# Patient Record
Sex: FEMALE | Race: BLACK OR AFRICAN AMERICAN | NOT HISPANIC OR LATINO | URBAN - METROPOLITAN AREA
[De-identification: names, ages, dates, MRNs, and addresses within clinical notes are randomized per-mention and may not be internally consistent; named-entity substitution may affect disease eponyms.]

---

## 2015-10-26 LAB
HPV_EXT - HISTORICAL: NORMAL
PAP SMEAR - HIM PATIENT REPORTED: NORMAL

## 2017-01-05 ENCOUNTER — COMMUNICATION - HEALTHEAST (OUTPATIENT)
Dept: FAMILY MEDICINE | Facility: CLINIC | Age: 37
End: 2017-01-05

## 2017-05-18 ENCOUNTER — OFFICE VISIT - HEALTHEAST (OUTPATIENT)
Dept: FAMILY MEDICINE | Facility: CLINIC | Age: 37
End: 2017-05-18

## 2017-05-18 DIAGNOSIS — R35.0 FREQUENCY OF URINATION: ICD-10-CM

## 2017-05-18 DIAGNOSIS — E55.9 VITAMIN D INSUFFICIENCY: ICD-10-CM

## 2017-05-18 LAB — HBA1C MFR BLD: 5.6 % (ref 3.5–6.1)

## 2017-05-23 ENCOUNTER — RECORDS - HEALTHEAST (OUTPATIENT)
Dept: ADMINISTRATIVE | Facility: OTHER | Age: 37
End: 2017-05-23

## 2017-05-25 ENCOUNTER — AMBULATORY - HEALTHEAST (OUTPATIENT)
Dept: FAMILY MEDICINE | Facility: CLINIC | Age: 37
End: 2017-05-25

## 2017-05-25 DIAGNOSIS — N39.0 UTI (URINARY TRACT INFECTION): ICD-10-CM

## 2017-05-26 ENCOUNTER — HOSPITAL ENCOUNTER (OUTPATIENT)
Dept: ULTRASOUND IMAGING | Facility: CLINIC | Age: 37
Discharge: HOME | End: 2017-05-26
Attending: OBSTETRICS & GYNECOLOGY

## 2017-05-26 ENCOUNTER — HOSPITAL ENCOUNTER (OUTPATIENT)
Dept: MAMMOGRAPHY | Facility: CLINIC | Age: 37
Discharge: HOME | End: 2017-05-26
Attending: OBSTETRICS & GYNECOLOGY

## 2017-05-26 DIAGNOSIS — N63.10 LUMP OF BREAST, RIGHT: ICD-10-CM

## 2017-07-03 ENCOUNTER — OFFICE VISIT - HEALTHEAST (OUTPATIENT)
Dept: FAMILY MEDICINE | Facility: CLINIC | Age: 37
End: 2017-07-03

## 2017-07-03 ENCOUNTER — COMMUNICATION - HEALTHEAST (OUTPATIENT)
Dept: FAMILY MEDICINE | Facility: CLINIC | Age: 37
End: 2017-07-03

## 2017-07-03 DIAGNOSIS — M54.9 BACK PAIN: ICD-10-CM

## 2017-07-03 ASSESSMENT — MIFFLIN-ST. JEOR: SCORE: 1316.02

## 2017-08-01 ENCOUNTER — COMMUNICATION - HEALTHEAST (OUTPATIENT)
Dept: FAMILY MEDICINE | Facility: CLINIC | Age: 37
End: 2017-08-01

## 2017-08-01 DIAGNOSIS — M54.9 BACK PAIN: ICD-10-CM

## 2017-09-15 ENCOUNTER — OFFICE VISIT - HEALTHEAST (OUTPATIENT)
Dept: FAMILY MEDICINE | Facility: CLINIC | Age: 37
End: 2017-09-15

## 2017-09-15 DIAGNOSIS — R30.0 DYSURIA: ICD-10-CM

## 2017-09-15 DIAGNOSIS — R39.89 SUSPECTED UTI: ICD-10-CM

## 2017-10-05 ENCOUNTER — OFFICE VISIT - HEALTHEAST (OUTPATIENT)
Dept: FAMILY MEDICINE | Facility: CLINIC | Age: 37
End: 2017-10-05

## 2017-10-05 DIAGNOSIS — Z23 NEED FOR IMMUNIZATION AGAINST INFLUENZA: ICD-10-CM

## 2017-10-05 DIAGNOSIS — R51.9 HEADACHE: ICD-10-CM

## 2017-10-05 DIAGNOSIS — G47.00 INSOMNIA: ICD-10-CM

## 2017-10-05 DIAGNOSIS — R53.83 FATIGUE: ICD-10-CM

## 2017-10-09 ENCOUNTER — COMMUNICATION - HEALTHEAST (OUTPATIENT)
Dept: FAMILY MEDICINE | Facility: CLINIC | Age: 37
End: 2017-10-09

## 2017-10-09 DIAGNOSIS — G47.00 INSOMNIA: ICD-10-CM

## 2018-02-15 ENCOUNTER — OFFICE VISIT - HEALTHEAST (OUTPATIENT)
Dept: FAMILY MEDICINE | Facility: CLINIC | Age: 38
End: 2018-02-15

## 2018-02-15 DIAGNOSIS — R00.2 PALPITATION: ICD-10-CM

## 2018-02-15 DIAGNOSIS — M54.9 BACK PAIN: ICD-10-CM

## 2018-04-25 ENCOUNTER — COMMUNICATION - HEALTHEAST (OUTPATIENT)
Dept: FAMILY MEDICINE | Facility: CLINIC | Age: 38
End: 2018-04-25

## 2018-04-25 DIAGNOSIS — G47.00 INSOMNIA: ICD-10-CM

## 2018-08-26 ENCOUNTER — COMMUNICATION - HEALTHEAST (OUTPATIENT)
Dept: FAMILY MEDICINE | Facility: CLINIC | Age: 38
End: 2018-08-26

## 2018-08-26 DIAGNOSIS — R10.9 ABDOMINAL PAIN: ICD-10-CM

## 2018-09-08 ENCOUNTER — COMMUNICATION - HEALTHEAST (OUTPATIENT)
Dept: FAMILY MEDICINE | Facility: CLINIC | Age: 38
End: 2018-09-08

## 2018-09-08 DIAGNOSIS — M54.9 BACK PAIN: ICD-10-CM

## 2018-09-25 ENCOUNTER — OFFICE VISIT - HEALTHEAST (OUTPATIENT)
Dept: FAMILY MEDICINE | Facility: CLINIC | Age: 38
End: 2018-09-25

## 2018-09-25 DIAGNOSIS — J02.9 SORETHROAT: ICD-10-CM

## 2018-09-25 DIAGNOSIS — J32.9 SINUSITIS: ICD-10-CM

## 2018-09-25 LAB — DEPRECATED S PYO AG THROAT QL EIA: NORMAL

## 2018-09-26 LAB — GROUP A STREP BY PCR: NORMAL

## 2018-10-23 ENCOUNTER — COMMUNICATION - HEALTHEAST (OUTPATIENT)
Dept: FAMILY MEDICINE | Facility: CLINIC | Age: 38
End: 2018-10-23

## 2019-01-15 ENCOUNTER — COMMUNICATION - HEALTHEAST (OUTPATIENT)
Dept: FAMILY MEDICINE | Facility: CLINIC | Age: 39
End: 2019-01-15

## 2019-01-22 ENCOUNTER — AMBULATORY - HEALTHEAST (OUTPATIENT)
Dept: FAMILY MEDICINE | Facility: CLINIC | Age: 39
End: 2019-01-22

## 2019-01-22 DIAGNOSIS — N39.0 URINARY TRACT INFECTION WITHOUT HEMATURIA, SITE UNSPECIFIED: ICD-10-CM

## 2019-04-17 ENCOUNTER — OFFICE VISIT - HEALTHEAST (OUTPATIENT)
Dept: FAMILY MEDICINE | Facility: CLINIC | Age: 39
End: 2019-04-17

## 2019-04-17 ENCOUNTER — COMMUNICATION - HEALTHEAST (OUTPATIENT)
Dept: FAMILY MEDICINE | Facility: CLINIC | Age: 39
End: 2019-04-17

## 2019-04-17 DIAGNOSIS — R53.82 CHRONIC FATIGUE: ICD-10-CM

## 2019-04-17 LAB
ALBUMIN SERPL-MCNC: 3.9 G/DL (ref 3.5–5)
ALP SERPL-CCNC: 41 U/L (ref 45–120)
ALT SERPL W P-5'-P-CCNC: <9 U/L (ref 0–45)
ANION GAP SERPL CALCULATED.3IONS-SCNC: 13 MMOL/L (ref 5–18)
AST SERPL W P-5'-P-CCNC: 20 U/L (ref 0–40)
BILIRUB SERPL-MCNC: 0.2 MG/DL (ref 0–1)
BUN SERPL-MCNC: 7 MG/DL (ref 8–22)
CALCIUM SERPL-MCNC: 9.3 MG/DL (ref 8.5–10.5)
CHLORIDE BLD-SCNC: 106 MMOL/L (ref 98–107)
CO2 SERPL-SCNC: 21 MMOL/L (ref 22–31)
CREAT SERPL-MCNC: 1.31 MG/DL (ref 0.6–1.1)
ERYTHROCYTE [DISTWIDTH] IN BLOOD BY AUTOMATED COUNT: 12.8 % (ref 11–14.5)
GFR SERPL CREATININE-BSD FRML MDRD: 46 ML/MIN/1.73M2
GLUCOSE BLD-MCNC: 78 MG/DL (ref 70–125)
HCT VFR BLD AUTO: 39 % (ref 35–47)
HGB BLD-MCNC: 13.2 G/DL (ref 12–16)
MCH RBC QN AUTO: 29.9 PG (ref 27–34)
MCHC RBC AUTO-ENTMCNC: 33.7 G/DL (ref 32–36)
MCV RBC AUTO: 89 FL (ref 80–100)
MONOCYTES NFR BLD AUTO: NEGATIVE %
PLATELET # BLD AUTO: 239 THOU/UL (ref 140–440)
PMV BLD AUTO: 7.9 FL (ref 7–10)
POTASSIUM BLD-SCNC: 3.9 MMOL/L (ref 3.5–5)
PROT SERPL-MCNC: 6.9 G/DL (ref 6–8)
RBC # BLD AUTO: 4.4 MILL/UL (ref 3.8–5.4)
SODIUM SERPL-SCNC: 140 MMOL/L (ref 136–145)
TSH SERPL DL<=0.005 MIU/L-ACNC: 1.51 UIU/ML (ref 0.3–5)
VIT B12 SERPL-MCNC: 364 PG/ML (ref 213–816)
WBC: 8.9 THOU/UL (ref 4–11)

## 2019-04-18 ENCOUNTER — AMBULATORY - HEALTHEAST (OUTPATIENT)
Dept: FAMILY MEDICINE | Facility: CLINIC | Age: 39
End: 2019-04-18

## 2019-04-18 ENCOUNTER — COMMUNICATION - HEALTHEAST (OUTPATIENT)
Dept: FAMILY MEDICINE | Facility: CLINIC | Age: 39
End: 2019-04-18

## 2019-04-18 DIAGNOSIS — R53.83 FATIGUE, UNSPECIFIED TYPE: ICD-10-CM

## 2019-04-18 LAB
25(OH)D3 SERPL-MCNC: 22.6 NG/ML (ref 30–80)
B BURGDOR IGG+IGM SER QL: 0.04 INDEX VALUE

## 2019-04-22 ENCOUNTER — COMMUNICATION - HEALTHEAST (OUTPATIENT)
Dept: FAMILY MEDICINE | Facility: CLINIC | Age: 39
End: 2019-04-22

## 2019-04-22 DIAGNOSIS — Z11.1 SCREENING EXAMINATION FOR PULMONARY TUBERCULOSIS: ICD-10-CM

## 2019-04-25 ENCOUNTER — COMMUNICATION - HEALTHEAST (OUTPATIENT)
Dept: FAMILY MEDICINE | Facility: CLINIC | Age: 39
End: 2019-04-25

## 2019-04-25 ENCOUNTER — RECORDS - HEALTHEAST (OUTPATIENT)
Dept: GENERAL RADIOLOGY | Facility: CLINIC | Age: 39
End: 2019-04-25

## 2019-04-25 ENCOUNTER — OFFICE VISIT - HEALTHEAST (OUTPATIENT)
Dept: FAMILY MEDICINE | Facility: CLINIC | Age: 39
End: 2019-04-25

## 2019-04-25 DIAGNOSIS — N17.9 AKI (ACUTE KIDNEY INJURY) (H): ICD-10-CM

## 2019-04-25 DIAGNOSIS — R40.0 SOMNOLENCE: ICD-10-CM

## 2019-04-25 DIAGNOSIS — R53.83 FATIGUE, UNSPECIFIED TYPE: ICD-10-CM

## 2019-04-25 DIAGNOSIS — Z11.1 SCREENING-PULMONARY TB: ICD-10-CM

## 2019-04-25 DIAGNOSIS — Z11.1 ENCOUNTER FOR SCREENING FOR RESPIRATORY TUBERCULOSIS: ICD-10-CM

## 2019-04-25 LAB
ANION GAP SERPL CALCULATED.3IONS-SCNC: 10 MMOL/L (ref 5–18)
BUN SERPL-MCNC: 6 MG/DL (ref 8–22)
CALCIUM SERPL-MCNC: 9.6 MG/DL (ref 8.5–10.5)
CHLORIDE BLD-SCNC: 105 MMOL/L (ref 98–107)
CO2 SERPL-SCNC: 23 MMOL/L (ref 22–31)
CREAT SERPL-MCNC: 0.72 MG/DL (ref 0.6–1.1)
GFR SERPL CREATININE-BSD FRML MDRD: >60 ML/MIN/1.73M2
GLUCOSE BLD-MCNC: 98 MG/DL (ref 70–125)
POTASSIUM BLD-SCNC: 3.8 MMOL/L (ref 3.5–5)
SODIUM SERPL-SCNC: 138 MMOL/L (ref 136–145)

## 2019-07-03 ENCOUNTER — OFFICE VISIT - HEALTHEAST (OUTPATIENT)
Dept: FAMILY MEDICINE | Facility: CLINIC | Age: 39
End: 2019-07-03

## 2019-07-03 DIAGNOSIS — R35.0 INCREASED FREQUENCY OF URINATION: ICD-10-CM

## 2019-07-03 DIAGNOSIS — R10.13 EPIGASTRIC PAIN: ICD-10-CM

## 2019-07-03 LAB
ALBUMIN UR-MCNC: NEGATIVE MG/DL
APPEARANCE UR: CLEAR
BILIRUB UR QL STRIP: NEGATIVE
COLOR UR AUTO: YELLOW
GLUCOSE UR STRIP-MCNC: NEGATIVE MG/DL
HGB UR QL STRIP: NEGATIVE
KETONES UR STRIP-MCNC: NEGATIVE MG/DL
LEUKOCYTE ESTERASE UR QL STRIP: NEGATIVE
NITRATE UR QL: NEGATIVE
PH UR STRIP: 6 [PH] (ref 5–8)
SP GR UR STRIP: 1.01 (ref 1–1.03)
UROBILINOGEN UR STRIP-ACNC: NORMAL

## 2019-07-03 ASSESSMENT — MIFFLIN-ST. JEOR: SCORE: 1335.86

## 2019-07-04 LAB — BACTERIA SPEC CULT: NORMAL

## 2019-09-26 ENCOUNTER — OFFICE VISIT - HEALTHEAST (OUTPATIENT)
Dept: FAMILY MEDICINE | Facility: CLINIC | Age: 39
End: 2019-09-26

## 2019-09-26 ENCOUNTER — COMMUNICATION - HEALTHEAST (OUTPATIENT)
Dept: FAMILY MEDICINE | Facility: CLINIC | Age: 39
End: 2019-09-26

## 2019-09-26 DIAGNOSIS — R07.0 THROAT PAIN: ICD-10-CM

## 2019-09-26 LAB — DEPRECATED S PYO AG THROAT QL EIA: NORMAL

## 2019-09-27 LAB — GROUP A STREP BY PCR: NORMAL

## 2019-10-07 ENCOUNTER — OFFICE VISIT - HEALTHEAST (OUTPATIENT)
Dept: FAMILY MEDICINE | Facility: CLINIC | Age: 39
End: 2019-10-07

## 2019-10-07 DIAGNOSIS — M79.10 MYALGIA: ICD-10-CM

## 2019-10-07 ASSESSMENT — MIFFLIN-ST. JEOR: SCORE: 1343.23

## 2019-10-09 ENCOUNTER — COMMUNICATION - HEALTHEAST (OUTPATIENT)
Dept: FAMILY MEDICINE | Facility: CLINIC | Age: 39
End: 2019-10-09

## 2019-10-09 DIAGNOSIS — F51.01 PRIMARY INSOMNIA: ICD-10-CM

## 2019-10-09 DIAGNOSIS — G89.4 CHRONIC PAIN SYNDROME: ICD-10-CM

## 2019-12-06 ENCOUNTER — OFFICE VISIT - HEALTHEAST (OUTPATIENT)
Dept: SLEEP MEDICINE | Facility: CLINIC | Age: 39
End: 2019-12-06

## 2019-12-06 DIAGNOSIS — G47.61 PERIODIC LIMB MOVEMENT DISORDER: ICD-10-CM

## 2019-12-06 DIAGNOSIS — G47.00 PERSISTENT INSOMNIA: ICD-10-CM

## 2019-12-06 DIAGNOSIS — Z72.821 INADEQUATE SLEEP HYGIENE: ICD-10-CM

## 2019-12-06 DIAGNOSIS — G47.10 HYPERSOMNIA, UNSPECIFIED: ICD-10-CM

## 2019-12-06 ASSESSMENT — MIFFLIN-ST. JEOR: SCORE: 1361.38

## 2019-12-18 ENCOUNTER — OFFICE VISIT - HEALTHEAST (OUTPATIENT)
Dept: FAMILY MEDICINE | Facility: CLINIC | Age: 39
End: 2019-12-18

## 2019-12-18 DIAGNOSIS — G89.29 CHRONIC BILATERAL LOW BACK PAIN WITHOUT SCIATICA: ICD-10-CM

## 2019-12-18 DIAGNOSIS — M54.50 CHRONIC BILATERAL LOW BACK PAIN WITHOUT SCIATICA: ICD-10-CM

## 2019-12-18 DIAGNOSIS — F51.01 PRIMARY INSOMNIA: ICD-10-CM

## 2019-12-18 ASSESSMENT — MIFFLIN-ST. JEOR: SCORE: 1357.69

## 2019-12-30 ENCOUNTER — COMMUNICATION - HEALTHEAST (OUTPATIENT)
Dept: FAMILY MEDICINE | Facility: CLINIC | Age: 39
End: 2019-12-30

## 2019-12-30 DIAGNOSIS — N30.00 ACUTE CYSTITIS WITHOUT HEMATURIA: ICD-10-CM

## 2020-01-17 ENCOUNTER — RECORDS - HEALTHEAST (OUTPATIENT)
Dept: ADMINISTRATIVE | Facility: OTHER | Age: 40
End: 2020-01-17

## 2020-01-22 ENCOUNTER — COMMUNICATION - HEALTHEAST (OUTPATIENT)
Dept: SLEEP MEDICINE | Facility: CLINIC | Age: 40
End: 2020-01-22

## 2020-02-03 DIAGNOSIS — G47.10 HYPERSOMNIA: Primary | ICD-10-CM

## 2020-03-04 ENCOUNTER — COMMUNICATION - HEALTHEAST (OUTPATIENT)
Dept: SLEEP MEDICINE | Facility: CLINIC | Age: 40
End: 2020-03-04

## 2020-05-22 ENCOUNTER — COMMUNICATION - HEALTHEAST (OUTPATIENT)
Dept: FAMILY MEDICINE | Facility: CLINIC | Age: 40
End: 2020-05-22

## 2020-05-28 ENCOUNTER — COMMUNICATION - HEALTHEAST (OUTPATIENT)
Dept: ADMINISTRATIVE | Facility: CLINIC | Age: 40
End: 2020-05-28

## 2020-05-29 ENCOUNTER — OFFICE VISIT - HEALTHEAST (OUTPATIENT)
Dept: FAMILY MEDICINE | Facility: CLINIC | Age: 40
End: 2020-05-29

## 2020-05-29 DIAGNOSIS — Z01.818 PRE-OPERATIVE EXAMINATION: ICD-10-CM

## 2020-05-29 DIAGNOSIS — N64.4 BREAST PAIN: ICD-10-CM

## 2020-05-29 LAB
HCG UR QL: NEGATIVE
HGB BLD-MCNC: 12.7 G/DL (ref 12–16)

## 2020-08-19 ENCOUNTER — COMMUNICATION - HEALTHEAST (OUTPATIENT)
Dept: CARDIOLOGY | Facility: CLINIC | Age: 40
End: 2020-08-19

## 2020-08-28 ENCOUNTER — RECORDS - HEALTHEAST (OUTPATIENT)
Dept: HEALTH INFORMATION MANAGEMENT | Facility: CLINIC | Age: 40
End: 2020-08-28

## 2020-11-12 ENCOUNTER — AMBULATORY - HEALTHEAST (OUTPATIENT)
Dept: NURSING | Facility: CLINIC | Age: 40
End: 2020-11-12

## 2021-02-11 ENCOUNTER — COMMUNICATION - HEALTHEAST (OUTPATIENT)
Dept: FAMILY MEDICINE | Facility: CLINIC | Age: 41
End: 2021-02-11

## 2021-02-15 ENCOUNTER — OFFICE VISIT - HEALTHEAST (OUTPATIENT)
Dept: FAMILY MEDICINE | Facility: CLINIC | Age: 41
End: 2021-02-15

## 2021-02-15 DIAGNOSIS — M54.50 CHRONIC BILATERAL LOW BACK PAIN WITHOUT SCIATICA: ICD-10-CM

## 2021-02-15 DIAGNOSIS — N76.0 ACUTE VAGINITIS: ICD-10-CM

## 2021-02-15 DIAGNOSIS — G89.29 CHRONIC BILATERAL LOW BACK PAIN WITHOUT SCIATICA: ICD-10-CM

## 2021-02-17 ENCOUNTER — RECORDS - HEALTHEAST (OUTPATIENT)
Dept: ADMINISTRATIVE | Facility: OTHER | Age: 41
End: 2021-02-17

## 2021-02-24 ENCOUNTER — HOSPITAL ENCOUNTER (OUTPATIENT)
Dept: MAMMOGRAPHY | Facility: CLINIC | Age: 41
Discharge: HOME | End: 2021-02-24
Attending: OBSTETRICS & GYNECOLOGY

## 2021-02-24 ENCOUNTER — HOSPITAL ENCOUNTER (OUTPATIENT)
Dept: ULTRASOUND IMAGING | Facility: CLINIC | Age: 41
Discharge: HOME | End: 2021-02-24
Attending: OBSTETRICS & GYNECOLOGY

## 2021-02-24 DIAGNOSIS — N63.0 BREAST LUMP: ICD-10-CM

## 2021-03-30 ENCOUNTER — AMBULATORY - HEALTHEAST (OUTPATIENT)
Dept: NURSING | Facility: CLINIC | Age: 41
End: 2021-03-30

## 2021-04-20 ENCOUNTER — AMBULATORY - HEALTHEAST (OUTPATIENT)
Dept: NURSING | Facility: CLINIC | Age: 41
End: 2021-04-20

## 2021-04-28 ENCOUNTER — COMMUNICATION - HEALTHEAST (OUTPATIENT)
Dept: FAMILY MEDICINE | Facility: CLINIC | Age: 41
End: 2021-04-28

## 2021-04-28 DIAGNOSIS — M54.50 CHRONIC BILATERAL LOW BACK PAIN WITHOUT SCIATICA: ICD-10-CM

## 2021-04-28 DIAGNOSIS — G89.29 CHRONIC BILATERAL LOW BACK PAIN WITHOUT SCIATICA: ICD-10-CM

## 2021-04-29 ENCOUNTER — RECORDS - HEALTHEAST (OUTPATIENT)
Dept: FAMILY MEDICINE | Facility: CLINIC | Age: 41
End: 2021-04-29

## 2021-05-27 ENCOUNTER — OFFICE VISIT - HEALTHEAST (OUTPATIENT)
Dept: FAMILY MEDICINE | Facility: CLINIC | Age: 41
End: 2021-05-27

## 2021-05-27 ENCOUNTER — RECORDS - HEALTHEAST (OUTPATIENT)
Dept: GENERAL RADIOLOGY | Facility: CLINIC | Age: 41
End: 2021-05-27

## 2021-05-27 DIAGNOSIS — K59.01 SLOW TRANSIT CONSTIPATION: ICD-10-CM

## 2021-05-27 DIAGNOSIS — Z13.220 SCREENING CHOLESTEROL LEVEL: ICD-10-CM

## 2021-05-27 DIAGNOSIS — L65.9 HAIR LOSS: ICD-10-CM

## 2021-05-27 DIAGNOSIS — E55.9 VITAMIN D INSUFFICIENCY: ICD-10-CM

## 2021-05-27 DIAGNOSIS — R10.13 ABDOMINAL PAIN, EPIGASTRIC: ICD-10-CM

## 2021-05-27 DIAGNOSIS — F51.01 PRIMARY INSOMNIA: ICD-10-CM

## 2021-05-27 DIAGNOSIS — R53.82 CHRONIC FATIGUE: ICD-10-CM

## 2021-05-27 DIAGNOSIS — G89.29 CHRONIC BILATERAL LOW BACK PAIN WITHOUT SCIATICA: ICD-10-CM

## 2021-05-27 DIAGNOSIS — Z11.1 ENCOUNTER FOR SCREENING FOR RESPIRATORY TUBERCULOSIS: ICD-10-CM

## 2021-05-27 DIAGNOSIS — M54.50 CHRONIC BILATERAL LOW BACK PAIN WITHOUT SCIATICA: ICD-10-CM

## 2021-05-27 DIAGNOSIS — Z11.1 SCREENING EXAMINATION FOR PULMONARY TUBERCULOSIS: ICD-10-CM

## 2021-05-27 LAB
ALBUMIN SERPL-MCNC: 4 G/DL (ref 3.5–5)
ALP SERPL-CCNC: 44 U/L (ref 45–120)
ALT SERPL W P-5'-P-CCNC: 10 U/L (ref 0–45)
ANION GAP SERPL CALCULATED.3IONS-SCNC: 11 MMOL/L (ref 5–18)
AST SERPL W P-5'-P-CCNC: 23 U/L (ref 0–40)
BILIRUB SERPL-MCNC: 0.4 MG/DL (ref 0–1)
BUN SERPL-MCNC: 6 MG/DL (ref 8–22)
CALCIUM SERPL-MCNC: 9.1 MG/DL (ref 8.5–10.5)
CHLORIDE BLD-SCNC: 105 MMOL/L (ref 98–107)
CHOLEST SERPL-MCNC: 223 MG/DL
CO2 SERPL-SCNC: 23 MMOL/L (ref 22–31)
CREAT SERPL-MCNC: 0.68 MG/DL (ref 0.6–1.1)
ERYTHROCYTE [DISTWIDTH] IN BLOOD BY AUTOMATED COUNT: 14 % (ref 11–14.5)
FASTING STATUS PATIENT QL REPORTED: ABNORMAL
GFR SERPL CREATININE-BSD FRML MDRD: >60 ML/MIN/1.73M2
GLUCOSE BLD-MCNC: 91 MG/DL (ref 70–125)
HCT VFR BLD AUTO: 39.2 % (ref 35–47)
HDLC SERPL-MCNC: 75 MG/DL
HGB BLD-MCNC: 13.4 G/DL (ref 12–16)
LDLC SERPL CALC-MCNC: 131 MG/DL
LIPASE SERPL-CCNC: 11 U/L (ref 0–52)
MCH RBC QN AUTO: 30.1 PG (ref 27–34)
MCHC RBC AUTO-ENTMCNC: 34.2 G/DL (ref 32–36)
MCV RBC AUTO: 88 FL (ref 80–100)
PLATELET # BLD AUTO: 266 THOU/UL (ref 140–440)
PMV BLD AUTO: 9.8 FL (ref 7–10)
POTASSIUM BLD-SCNC: 4 MMOL/L (ref 3.5–5)
PROT SERPL-MCNC: 6.9 G/DL (ref 6–8)
RBC # BLD AUTO: 4.45 MILL/UL (ref 3.8–5.4)
SODIUM SERPL-SCNC: 139 MMOL/L (ref 136–145)
TRIGL SERPL-MCNC: 87 MG/DL
TSH SERPL DL<=0.005 MIU/L-ACNC: 2.28 UIU/ML (ref 0.3–5)
WBC: 7.7 THOU/UL (ref 4–11)

## 2021-05-27 RX ORDER — FERROUS SULFATE 325(65) MG
1 TABLET ORAL
Status: SHIPPED | COMMUNITY
Start: 2021-05-27 | End: 2022-08-18

## 2021-05-27 RX ORDER — TRAMADOL HYDROCHLORIDE 50 MG/1
50 TABLET ORAL DAILY PRN
Qty: 30 TABLET | Refills: 0 | Status: SHIPPED | OUTPATIENT
Start: 2021-05-27 | End: 2022-08-18

## 2021-05-27 RX ORDER — MULTIPLE VITAMINS W/ MINERALS TAB 9MG-400MCG
1 TAB ORAL DAILY
Status: SHIPPED | COMMUNITY
Start: 2021-05-27 | End: 2024-03-06

## 2021-05-27 NOTE — TELEPHONE ENCOUNTER
Spoke to patient.  Reviewed her results.  She will take 4000 vitamin D daily.  I will give her Adderall for the fatigue.  She will stop NSAIDs, because elevation creatinine.  She will follow-up with me in 1 month

## 2021-05-28 LAB
25(OH)D3 SERPL-MCNC: 43 NG/ML (ref 30–80)
25(OH)D3 SERPL-MCNC: 43 NG/ML (ref 30–80)

## 2021-05-28 NOTE — TELEPHONE ENCOUNTER
Per pcp, please start prior authorization.     Adderall XR 10mg, once daily  DX:  Fatigue  Medications that she has tried:  None other

## 2021-05-28 NOTE — TELEPHONE ENCOUNTER
Please start prior authorization.    Adderall XR 10mg, once daily  DX:  Fatigue  Medications that she has tried:  None other

## 2021-05-28 NOTE — PROGRESS NOTES
ASSESSMENT/PLAN:  Screening-pulmonary TB  This is a 38-year-old female who comes in today for tuberculosis screening for her work.  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  Chest x-ray today did not reveal any active pulmonary processes.    ASHLEY (acute kidney injury) (H)  -     Basic Metabolic Panel  The patient had recent acute kidney injury likely from taking Aleve for her back pain.  She since has stopped.  We will recheck the creatinine and GFR    Somnolence  Her Adderall was prescribed to help with her fatigue and sleepiness.  However, her insurance denied coverage for Adderall.  I do not have any other alternative medications that will be covered by her insurance.  I offered to give her a referral to the sleep clinic because of her frequent waking up at night but she declined    Other orders  -     Influenza, Seasonal Quad, Preservative Free 36+ Months    SUBJECTIVE:    Brittany Pickard is a 38 y.o. female who came in today for chest x-ray for tuberculosis screening.  The patient received BCG vaccine.  She has had positive PPD skin test in the past.  She works as an .  She just got a new job and her employer is asking for tuberculosis status.  Patient is not actively coughing.  She does not have any history of hemoptysis, chills, night sweats, unintentional weight loss.    The patient has been feeling very fatigued and sleepy.  She brought this up during her last visit with me.  Mononucleosis, Lyme's, thyroid, CBC, CMP, vitamin D, B12 were all drawn and the result showed a slightly low vitamin D at 22.6 and acute renal insufficiency with creatinine of 1.31 and GFR of 56.  After much discussion with the patient, she stated that she has been taking NSAIDs for her back pain.  In the discussion regarding her fatigue and sleepiness, the patient says that she wakes up frequently at night.  She does not snore.  She does not have apnea spells.  She does not feel depressed or anxious.   Adderall prescription was sent to her pharmacy but she received news from her insurance yesterday that it would not cover the Adderall.  The patient is wondering if there is any other stimulant or medication that will help with her fatigue and sleepiness    Review of Systems (except those mentioned above)  Constitutional: Negative.   HENT: Negative.   Eyes: Negative.   Respiratory: Negative.   Cardiovascular: Negative.   Gastrointestinal: Negative.   Endocrine: Negative.   Genitourinary: Negative.   Musculoskeletal: Negative.   Skin: Negative.   Allergic/Immunologic: Negative.   Neurological: Negative.   Hematological: Negative.   Psychiatric/Behavioral: Negative.     Patient Active Problem List    Diagnosis Date Noted     Allergic Rhinitis      Insomnia      Vitamin D Deficiency      Allergies   Allergen Reactions     Other Allergy (See Comments)      Melatonin TABS, 2013.       Current Outpatient Medications   Medication Sig Dispense Refill     dextroamphetamine-amphetamine (ADDERALL XR) 10 MG 24 hr capsule Take 1 capsule (10 mg total) by mouth daily. 30 capsule 0     No current facility-administered medications for this visit.      No past medical history on file.  Past Surgical History:   Procedure Laterality Date     AUGMENTATION MAMMAPLASTY       [place]      Description:  Section;  Recorded: 2011;     Social History     Socioeconomic History     Marital status: Single     Spouse name: None     Number of children: None     Years of education: None     Highest education level: None   Occupational History     None   Social Needs     Financial resource strain: None     Food insecurity:     Worry: None     Inability: None     Transportation needs:     Medical: None     Non-medical: None   Tobacco Use     Smoking status: Never Smoker     Smokeless tobacco: Never Used   Substance and Sexual Activity     Alcohol use: None     Drug use: None     Sexual activity: None   Lifestyle      Physical activity:     Days per week: None     Minutes per session: None     Stress: None   Relationships     Social connections:     Talks on phone: None     Gets together: None     Attends Yarsanism service: None     Active member of club or organization: None     Attends meetings of clubs or organizations: None     Relationship status: None     Intimate partner violence:     Fear of current or ex partner: None     Emotionally abused: None     Physically abused: None     Forced sexual activity: None   Other Topics Concern     None   Social History Narrative    ** Merged History Encounter **          No family history on file.      OBJECTIVE:    Vitals:    04/25/19 0808   BP: 100/80   Patient Site: Left Arm   Patient Position: Sitting   Cuff Size: Adult Regular   Pulse: 72   Weight: 150 lb (68 kg)     Body mass index is 25.15 kg/m .    Physical Exam:  Constitutional: Patient is oriented to person, place, and time. Patient appears well-developed and well-nourished. No distress.   Head: Normocephalic and atraumatic.   Right Ear: External ear normal.   Left Ear: External ear normal.   Nose: Nose normal.   Eyes: Conjunctivae and EOM are normal. Right eye exhibits no discharge. Left eye exhibits no discharge. No scleral icterus.   Neurological: Patient is alert and oriented to person, place, and time. Patient has normal reflexes. No cranial nerve deficit. Coordination normal.   Skin: No rash noted. Patient is not diaphoretic. No erythema. No pallor.        Results for orders placed or performed in visit on 04/17/19   Thyroid Stimulating Hormone (TSH)   Result Value Ref Range    TSH 1.51 0.30 - 5.00 uIU/mL   Lyme Antibody Cascade   Result Value Ref Range    Lyme Antibody Cascade 0.04 <0.90 Index Value   Vitamin D, Total (25-Hydroxy)   Result Value Ref Range    Vitamin D, Total (25-Hydroxy) 22.6 (L) 30.0 - 80.0 ng/mL   Comprehensive Metabolic Panel   Result Value Ref Range    Sodium 140 136 - 145 mmol/L    Potassium  3.9 3.5 - 5.0 mmol/L    Chloride 106 98 - 107 mmol/L    CO2 21 (L) 22 - 31 mmol/L    Anion Gap, Calculation 13 5 - 18 mmol/L    Glucose 78 70 - 125 mg/dL    BUN 7 (L) 8 - 22 mg/dL    Creatinine 1.31 (H) 0.60 - 1.10 mg/dL    GFR MDRD Af Amer 56 (L) >60 mL/min/1.73m2    GFR MDRD Non Af Amer 46 (L) >60 mL/min/1.73m2    Bilirubin, Total 0.2 0.0 - 1.0 mg/dL    Calcium 9.3 8.5 - 10.5 mg/dL    Protein, Total 6.9 6.0 - 8.0 g/dL    Albumin 3.9 3.5 - 5.0 g/dL    Alkaline Phosphatase 41 (L) 45 - 120 U/L    AST 20 0 - 40 U/L    ALT <9 0 - 45 U/L   HM2(CBC w/o Differential)   Result Value Ref Range    WBC 8.9 4.0 - 11.0 thou/uL    RBC 4.40 3.80 - 5.40 mill/uL    Hemoglobin 13.2 12.0 - 16.0 g/dL    Hematocrit 39.0 35.0 - 47.0 %    MCV 89 80 - 100 fL    MCH 29.9 27.0 - 34.0 pg    MCHC 33.7 32.0 - 36.0 g/dL    RDW 12.8 11.0 - 14.5 %    Platelets 239 140 - 440 thou/uL    MPV 7.9 7.0 - 10.0 fL   Vitamin B12   Result Value Ref Range    Vitamin B-12 364 213 - 816 pg/mL   Mononucleosis Screen   Result Value Ref Range    Mono Screen Negative Negative

## 2021-05-28 NOTE — TELEPHONE ENCOUNTER
Who is calling:  Patient   Reason for Call: Patient's employer requesting patient to have updated [place].  Pt had  positive skin test for TB in6/24/15 (  X-ray  6/26/15 - see chart)   Pt  Doesn't want to come  in to clinic,  just wants updated letter stating she is negative for TB,   Date of last appointment with primary care:  4/17/19   Has the patient been recently seen:  Yes  Okay to leave a detailed message: Yes

## 2021-05-28 NOTE — TELEPHONE ENCOUNTER
Central PA team  [phone]  Pool: [facility] PA MED ([id number])          PA has been initiated.       PA form completed and faxed insurance via Cover My Meds     Key:  [id number]     Medication:  Amphetamine-Dextroamphet ER 10MG OR CP24    Insurance:  [place]        Response will be received via fax and may take up to 5-10 business days depending on plan

## 2021-05-28 NOTE — TELEPHONE ENCOUNTER
Please create telephone encounter for prior authorization & route to the appropriate deparment.     Adderall XR 10mg, once daily  DX:  Fatigue  Medications that she has tried:  None other

## 2021-05-29 ENCOUNTER — RECORDS - HEALTHEAST (OUTPATIENT)
Dept: ADMINISTRATIVE | Facility: CLINIC | Age: 41
End: 2021-05-29

## 2021-05-30 NOTE — PROGRESS NOTES
ASSESSMENT/PLAN:    Increased frequency of urination  39-year-old female presented with 1 and half week of lower abdominal pressure which she has had similar to her urinary tract infection.  She does not have any other urinary symptoms.  Urinalysis was unremarkable.  I will treat her with Bactrim empirically.  Await urine culture for further management.  If she has further questions or concerns or no improvement of symptoms then to call.  She verbalized understanding and agreed with the plan  -     Urinalysis-UC if Indicated  -     Culture, Urine  -     sulfamethoxazole-trimethoprim (BACTRIM DS) 800-160 mg per tablet; Take 1 tablet by mouth 2 (two) times a day for 3 days.    Epigastric pain  The patient has had chronic epigastric pain that is worse with eating.  Will check H. pylori fecal antigen.  Avoid NSAIDs.  May take Tylenol as needed.  -     H. pylori Antigen, Stool(HPSAG); Future      SUBJECTIVE:    Brittany Pickard is a 39 y.o. female who came in today     Urine  For the last 1 and half week the patient has had lower abdominal pain associated with urination.  She denies dysuria.  She denies hematuria, urinary frequency, urinary urgency, back pain, fever, chills, myalgia, malaise.  The patient has had these similar symptoms in the past which turned out to be urinary tract infection.  Symptoms have improved with antibiotics    Epigastric pain  The patient has had 2 months of epigastric discomfort.  Epigastric pain is worse with eating.  Denies nausea or vomiting.  Denies fever or chills.  No radiation of the pain.  Patient does not smoke cigarettes.  Patient does not drink alcohol.  Occasionally takes ibuprofen and Tylenol for the pain.  No change in bowel habits.  Has been exposed to family members and friends with H. pylori    Review of Systems (except those mentioned above)  Constitutional: Negative.   HENT: Negative.   Eyes: Negative.   Respiratory: Negative.   Cardiovascular: Negative.   Gastrointestinal:  Negative.   Endocrine: Negative.   Genitourinary: Negative.   Musculoskeletal: Negative.   Skin: Negative.   Allergic/Immunologic: Negative.   Neurological: Negative.   Hematological: Negative.   Psychiatric/Behavioral: Negative.     Patient Active Problem List    Diagnosis Date Noted     Allergic Rhinitis      Insomnia      Vitamin D Deficiency      Allergies   Allergen Reactions     Other Allergy (See Comments)      Melatonin TABS, 2013.       Current Outpatient Medications   Medication Sig Dispense Refill     citalopram (CELEXA) 10 MG tablet Take 1 tablet (10 mg total) by mouth daily. 30 tablet 0     dextroamphetamine-amphetamine (ADDERALL XR) 10 MG 24 hr capsule Take 1 capsule (10 mg total) by mouth daily. 30 capsule 0     sulfamethoxazole-trimethoprim (BACTRIM DS) 800-160 mg per tablet Take 1 tablet by mouth 2 (two) times a day for 3 days. 6 tablet 0     No current facility-administered medications for this visit.      No past medical history on file.  Past Surgical History:   Procedure Laterality Date     AUGMENTATION MAMMAPLASTY       [place]IVERY [place]      Description:  Section;  Recorded: 2011;     Social History     Socioeconomic History     Marital status: Single     Spouse name: None     Number of children: None     Years of education: None     Highest education level: None   Occupational History     None   Social Needs     Financial resource strain: None     Food insecurity:     Worry: None     Inability: None     Transportation needs:     Medical: None     Non-medical: None   Tobacco Use     Smoking status: Never Smoker     Smokeless tobacco: Never Used   Substance and Sexual Activity     Alcohol use: None     Drug use: None     Sexual activity: None   Lifestyle     Physical activity:     Days per week: None     Minutes per session: None     Stress: None   Relationships     Social connections:     Talks on phone: None     Gets together: None     Attends Mu-ism service:  "None     Active member of club or organization: None     Attends meetings of clubs or organizations: None     Relationship status: None     Intimate partner violence:     Fear of current or ex partner: None     Emotionally abused: None     Physically abused: None     Forced sexual activity: None   Other Topics Concern     None   Social History Narrative    ** Merged History Encounter **          No family history on file.      OBJECTIVE:    Vitals:    07/03/19 1159   BP: 120/60   Pulse: 68   Weight: 146 lb 6 oz (66.4 kg)   Height: 5' 4.75\" (1.645 m)     Body mass index is 24.55 kg/m .    Physical Exam:  Constitutional: Patient was oriented to person, place, and time. Patient appeared well-developed and well-nourished. No distress.   Head: Normocephalic and atraumatic.   Right Ear: External ear normal.   Left Ear: External ear normal.   Nose: Nose normal.   Eyes: Conjunctivae and EOM were normal.  Right eye exhibited no discharge. Left eye exhibited no discharge. No scleral icterus.   Abdominal: Soft. Bowel sounds were normal. Patient exhibited no distension and no mass. There was tenderness at the epigastrium and suprapubic area. There was no rebound and no guarding.  No tenderness to percussion of the costovertebral angle  Skin: Skin was warm and dry. No rash noted. Patient was not diaphoretic. No erythema. No pallor.       Results for orders placed or performed in visit on 07/03/19   Urinalysis-UC if Indicated   Result Value Ref Range    Color, UA Yellow Colorless, Yellow, Straw, Light Yellow    Clarity, UA Clear Clear    Glucose, UA Negative Negative    Bilirubin, UA Negative Negative    Ketones, UA Negative Negative    Specific Gravity, UA 1.010 1.005 - 1.030    Blood, UA Negative Negative    pH, UA 6.0 5.0 - 8.0    Protein, UA Negative Negative mg/dL    Urobilinogen, UA 0.2 E.U./dL 0.2 E.U./dL, 1.0 E.U./dL    Nitrite, UA Negative Negative    Leukocytes, UA Negative Negative       "

## 2021-05-31 ENCOUNTER — RECORDS - HEALTHEAST (OUTPATIENT)
Dept: ADMINISTRATIVE | Facility: CLINIC | Age: 41
End: 2021-05-31

## 2021-05-31 VITALS — WEIGHT: 144 LBS | BODY MASS INDEX: 24.15 KG/M2

## 2021-05-31 VITALS — WEIGHT: 142 LBS | HEIGHT: 65 IN | BODY MASS INDEX: 23.66 KG/M2

## 2021-05-31 VITALS — BODY MASS INDEX: 23.98 KG/M2 | WEIGHT: 144.13 LBS

## 2021-06-01 VITALS — WEIGHT: 145.06 LBS | BODY MASS INDEX: 24.33 KG/M2

## 2021-06-01 NOTE — TELEPHONE ENCOUNTER
Below message was relayed to the patient.   Patient was agreeable, and will be completing an e-visit today. Nothing further needed at this time. Denise Taylor

## 2021-06-01 NOTE — PROGRESS NOTES
Assessment:   1. Throat pain  Positive for sinusitis with post nasal drip.   - Rapid Strep A Screen-Throat  - Group A Strep, RNA Direct Detection, Throat  - azithromycin (ZITHROMAX) 250 MG tablet; Take 1 tablet (250 mg total) by mouth daily for 5 days. Take 500 mg (2 x 250 mg tablets) on day 1 followed by 250 mg (1 tablet) on days 2-5.  Dispense: 6 tablet; Refill: 0  - fluticasone propionate (FLONASE ALLERGY RELIEF) 50 mcg/actuation nasal spray; 1 spray into each nostril 2 (two) times a day.  Dispense: 16 g; Refill: 0  - loratadine (CLARITIN) 10 mg tablet; Take 1 tablet (10 mg total) by mouth daily.  Dispense: 30 tablet; Refill: 2     Plan:   Patient placed on antibiotics.  Use of OTC analgesics recommended as well as salt water gargles.  Follow up as needed.     Subjective:   Brittany Pickard is a 39 y.o. female who presents for evaluation of sore throat. Associated symptoms include pain while swallowing, post nasal drip, sinus and nasal congestion and sore throat. Onset of symptoms was 2 days ago, and have been gradually worsening since that time. She is drinking moderate amounts of fluids. She has not had a recent close exposure to someone with proven streptococcal pharyngitis.    The following portions of the patient's history were reviewed and updated as appropriate: allergies, current medications, past family history, past medical history, past social history, past surgical history and problem list.    Review of Systems  A 12 point comprehensive review of systems was negative except as noted.      Objective:   /60 (Patient Site: Right Arm, Patient Position: Sitting, Cuff Size: Adult Regular)   Pulse 72   Temp 99.1  F (37.3  C) (Oral)   Resp 16   Wt 151 lb 9.6 oz (68.8 kg)   LMP 09/03/2019 (Approximate)   SpO2 100%   Breastfeeding? No   BMI 25.42 kg/m    General appearance: alert, appears stated age and cooperative  Head: Normocephalic, without obvious abnormality, atraumatic, sinuses tender to  percussion  Eyes: conjunctivae/corneas clear. PERRL, EOM's intact. Fundi benign.  Ears: normal TM's and external ear canals both ears  Nose: yellow discharge, moderate congestion, turbinates inflamed  Throat: lips, mucosa, and tongue normal; teeth and gums normal  Neck: no adenopathy, no carotid bruit, no JVD, supple, symmetrical, trachea midline and thyroid not enlarged, symmetric, no tenderness/mass/nodules  Lungs: clear to auscultation bilaterally  Heart: regular rate and rhythm, S1, S2 normal, no murmur, click, rub or gallop  Pulses: 2+ and symmetric  Skin: Skin color, texture, turgor normal. No rashes or lesions  Lymph nodes: Cervical, supraclavicular, and axillary nodes normal.  Neurologic: Grossly normal    Laboratory  Strep test done. Results:negative.

## 2021-06-02 VITALS — BODY MASS INDEX: 25.25 KG/M2 | WEIGHT: 150.56 LBS

## 2021-06-02 NOTE — PROGRESS NOTES
ASSESSMENT/PLAN:  Myalgia, insomnia  This is a pleasant 39-year-old female who has chronic myalgia and insomnia presented today for follow-up.  She has an appointment with sleep clinic in a couple months.  I will give her gabapentin.  She will call me in 1 week to update me.  Suspect her symptoms are related to fibromyalgia and insomnia.  She has tried citalopram without relief.  I gave her prescription for Adderall but it was not covered by her insurance.  Take 1000 IU vitamin D every day and plan for recheck in 3 months  -     gabapentin (NEURONTIN) 100 MG capsule; Take 100 mg by mouth at bedtime.    Other orders  -     Influenza,Seasonal,Quad,INJ =/>6months      Orders Placed This Encounter   Procedures     Influenza,Seasonal,Quad,INJ =/>6months       CHIEF COMPLAINT:  Chief Complaint   Patient presents with     Pain     all over        HISTORY OF PRESENT ILLNESS:  Brittany is a 39 y.o. female presenting to the clinic today with fatigue, myalgia and sleep difficulties. She takes a vitamin D supplement on weekends.     Body Aches/Fatigue/Sleep: She has been constantly fatigued and myalgia. She will fall asleep fine but she has difficulties staying asleep. She has a sleep study scheduled in December. She is not taking any medication today. When she tried to get a massage, she was in more pain than without the massage. She does not nap during the day.     Of note, the patient has had normal BNP, TSH, Lyme's screen, vitamin B12, CBC.  Vitamin D is slightly low at 22 and she is currently taking a daily vitamin D at 1000 IU    REVIEW OF SYSTEMS:   Constitutional: Negative.   HENT: Negative.   Eyes: Negative.   Respiratory: Negative.   Cardiovascular: Negative.   Gastrointestinal: Negative.   Endocrine: Negative.   Genitourinary: Negative.   Musculoskeletal: Negative.   Skin: Negative.   Allergic/Immunologic: Negative.   Neurological: Negative.   Hematological: Negative.   Psychiatric/Behavioral: Negative.   All other  "systems are negative.    PSFH:   She has four siblings. She is the oldest. She has 2 sisters and 2 brothers.     TOBACCO USE:  Social History     Tobacco Use   Smoking Status Never Smoker   Smokeless Tobacco Never Used       VITALS:  Vitals:    10/07/19 1255   BP: 116/74   Patient Site: Left Arm   Patient Position: Sitting   Cuff Size: Adult Regular   Pulse: 68   Weight: 148 lb (67.1 kg)   Height: 5' 4.75\" (1.645 m)     Wt Readings from Last 3 Encounters:   10/07/19 148 lb (67.1 kg)   09/26/19 151 lb 9.6 oz (68.8 kg)   07/03/19 146 lb 6 oz (66.4 kg)       PHYSICAL EXAM:  Constitutional: Patient is oriented to person, place, and time. Patient appears well-developed and well-nourished. No distress.   Head: Normocephalic and atraumatic.   Right Ear: External ear normal.   Left Ear: External ear normal.   Nose: Nose normal.   Eyes: Conjunctivae and EOM are normal. Right eye exhibits no discharge. Left eye exhibits no discharge. No scleral icterus.   Neurological: Patient is alert and oriented to person, place, and time. No cranial nerve deficit. Coordination normal.   Skin: No rash noted. Patient is not diaphoretic. No erythema. No pallor.      ADDITIONAL HISTORY SUMMARIZED (2): Reviewed note from 9/26/19.  DECISION TO OBTAIN EXTRA INFORMATION (1): None.   RADIOLOGY TESTS (1): None.  LABS (1): Reviewed labs from 4/25/19.  MEDICINE TESTS (1): None.  INDEPENDENT REVIEW (2 each): None.     The visit lasted a total of 9 minutes face to face with the patient. Over 50% of the time was spent counseling and educating the patient about fatigue, myalgia and sleep difficulties .    IAlberta,  am scribing for and in the presence of, Dr. Malave.    I, Dr. Malave, personally performed the services described in this documentation, as scribed by Alberta Rodriguez in my presence, and it is both accurate and complete.    MEDICATIONS:  Current Outpatient Medications   Medication Sig Dispense Refill     cholecalciferol, vitamin D3, " (VITAMIN D3 ORAL) Take 2,000 Int'l Units by mouth daily.       gabapentin (NEURONTIN) 100 MG capsule Take 100 mg by mouth at bedtime. 30 capsule 0     No current facility-administered medications for this visit.        Total data points:3

## 2021-06-03 VITALS
HEART RATE: 68 BPM | SYSTOLIC BLOOD PRESSURE: 116 MMHG | BODY MASS INDEX: 24.66 KG/M2 | DIASTOLIC BLOOD PRESSURE: 74 MMHG | WEIGHT: 148 LBS | HEIGHT: 65 IN

## 2021-06-03 VITALS
BODY MASS INDEX: 25.42 KG/M2 | WEIGHT: 151.6 LBS | SYSTOLIC BLOOD PRESSURE: 106 MMHG | HEART RATE: 72 BPM | RESPIRATION RATE: 16 BRPM | TEMPERATURE: 99.1 F | OXYGEN SATURATION: 100 % | DIASTOLIC BLOOD PRESSURE: 60 MMHG

## 2021-06-03 VITALS — WEIGHT: 150.9 LBS | BODY MASS INDEX: 25.31 KG/M2

## 2021-06-03 VITALS — HEIGHT: 65 IN | WEIGHT: 146.38 LBS | BODY MASS INDEX: 24.39 KG/M2

## 2021-06-03 VITALS — BODY MASS INDEX: 25.15 KG/M2 | WEIGHT: 150 LBS

## 2021-06-04 VITALS
OXYGEN SATURATION: 96 % | WEIGHT: 150.7 LBS | DIASTOLIC BLOOD PRESSURE: 82 MMHG | BODY MASS INDEX: 25.27 KG/M2 | HEART RATE: 82 BPM | SYSTOLIC BLOOD PRESSURE: 116 MMHG

## 2021-06-04 VITALS
BODY MASS INDEX: 25.19 KG/M2 | HEIGHT: 65 IN | HEART RATE: 68 BPM | SYSTOLIC BLOOD PRESSURE: 112 MMHG | WEIGHT: 151.19 LBS | DIASTOLIC BLOOD PRESSURE: 76 MMHG

## 2021-06-04 VITALS
HEIGHT: 65 IN | SYSTOLIC BLOOD PRESSURE: 120 MMHG | WEIGHT: 152 LBS | HEART RATE: 88 BPM | BODY MASS INDEX: 25.33 KG/M2 | OXYGEN SATURATION: 99 % | DIASTOLIC BLOOD PRESSURE: 78 MMHG

## 2021-06-04 NOTE — PROGRESS NOTES
[facility]  Outpatient Sleep Medicine Consultation      Name: Brittany Pickard       MRN# [id number]  Age: 39 y.o.         YOB: 1980    Date of Consultation: 12/6/2019  Consultation is requested by: Liberty Fonseca MD  [address] [place] [address]  Primary care provider: Liberty Fonseca MD        Assessment and Plan:  1. Hypersomnia, unspecified  An attended polysomnogram study (PSG) is recommended as she is low/intermediate risk for JENNIFER. If insurance will not cover an in-lab study, home study testing will be performed. Patient has agreed to this plan.  Counseling included a comprehensive review of diagnostic and therapeutic strategies as well as risks of inadequate therapy. She will follow up with me in approximately two weeks after her sleep study has been competed to review the results and discuss plan of care.    The patient was strongly advised to avoid driving, operating any heavy machinery or engaging in other hazardous situations while drowsy or sleepy.  Patient was counseled on the importance of driving while alert and to pull over if drowsy.      2. Persistent insomnia  It is possible that this patient has chronic insomnia with a psychophysiologic component.  This would be suggested by sleeping better in the sleep lab setting compared to home.  In this case, she may benefit from making some changes in her sleep environment.    3. Periodic limb movement disorder  Periodic limb movement disorder will be assessed on the overnight sleep study.  I advised the patient to increase her gabapentin dose to 300 mg nightly to determine if this helped her symptoms.    4. Inadequate sleep hygiene  I recommended that she eliminate electronic screen time prior to sleep.  Instead, she should engage in quiet activities in low light that would allow her to improve her sleep quality.      Chief Complaint: Tired      History of Present  Illness:    Brittany Pickard is a 39 y.o. female who I am seeing for the first time in my adult sleep medicine clinic.  She reports a history of daytime fatigue that has been present for the past 6 years.  The fatigue has increased introductory and she associates no inciting factors.  She struggles to get through her day.  Her score on the [place] Sleepiness Scale is in the normal range at 5 out of 24.  Typical bedtime is at 9 PM on weekdays with a 1 hour sleep latency.  She may awaken 4 times during the night starting at midnight.  At this time she has frequent tossing and turning until her final wake up time at 5 AM.  On weekends her bedtime is at 11 PM and again it is her habit to use electronic screens prior to sleep.  Sleep latency is 2 hours and final wake up time is 8 AM with tossing and turning throughout the night.  She has tried melatonin and NyQuil for relief but neither was helpful.    She denies history of anemia, hypertension, diabetes, stroke, heart attack, snoring, witnessed apnea events.  She sleeps alone.  She describes a global tenderness in her muscles for which gabapentin and pregabalin were prescribed.  She tells me that she took 2 Capsules of gabapentin and discontinued when there was no relief.  She has recently taken supplemental vitamin D without improvement in symptoms.            Medications:      Current Outpatient Medications   Medication Sig Dispense Refill     cholecalciferol, vitamin D3, (VITAMIN D3 ORAL) Take 4,000 Int'l Units by mouth daily.        ibuprofen (ADVIL,MOTRIN) 200 MG tablet Take 200 mg by mouth every 6 (six) hours as needed for pain.       No current facility-administered medications for this visit.          Allergies   Allergen Reactions     Melatonin Hives     Other Allergy (See Comments)      Melatonin TABS, 09/01/2013.           Past Medical History:    History reviewed. No pertinent past medical history.      Past Surgical History:    Past Surgical History:  "  Procedure Laterality Date     AUGMENTATION MAMMAPLASTY       [place]  DELIVERY ONLY      Description:  Section;  Recorded: 2011;         Social History:    Social History     Tobacco Use     Smoking status: Never Smoker     Smokeless tobacco: Never Used   Substance Use Topics     Alcohol use: Not on file         Family History:    History reviewed. No pertinent family history.      Review of Systems:    A complete 10 point review of systems was negative other than HPI or as commented below.      Physical Examination:  /78 (Patient Site: Right Arm, Patient Position: Sitting, Cuff Size: Adult Regular)   Pulse 88   Ht 5' 4.75\" (1.645 m)   Wt 152 lb (68.9 kg)   SpO2 99%   BMI 25.49 kg/m    Neck circumference: 14 inches  Constitutional: . Awake, alert, cooperative, in no apparent distress.  Eyes: No icterus.  ENT: Mallampati Class: III.  Tongue:  large.  Nose: Nares patent. No exudate/erythema. No septal deviation noted.    Cardiovascular: Regular S1 and S2, no gallops or murmurs.  Neck: Supple, no thyroid enlargement.  Pulmonary:  Chest symmetric, lungs clear bilaterally and no crackles, wheezes or rales.  Extremities:  No pretibial edema.  Skin:  No rash or significant lesions visible.  Gait:  Normal.  Neurologic: Alert, oriented, no focal neurological deficit.  Psychological: Euthymic; affect appropriate.    Data: All pertinent previous laboratory data reviewed.    No results found for: PH  Lab Results   Component Value Date    TSH 1.51 2019    TSH 1.52 10/05/2017     No components found for: GLC  Lab Results   Component Value Date    HGB 13.2 2019    HGB 12.4 10/05/2017     Lab Results   Component Value Date    BUN 6 (L) 2019    BUN 7 (L) 2019     Lab Results   Component Value Date    AST 20 2019    AST 22 10/05/2017    ALT <9 2019    ALT 9 10/05/2017    ALKPHOS 41 (L) 2019    ALKPHOS 35 (L) 10/05/2017     No components found for: UAMP, UBARB, " JAYDEN MANN, UCOC, OPIT, UPCP      Whitney Flores MD  12/6/2019  [place] [address] [facility], [address]  [place], [place]  [id number] [phone]    Copy to: Liberty Fonseca MD

## 2021-06-05 NOTE — TELEPHONE ENCOUNTER
Spoke with patient to schedule an overnight in lab sleep study - patient states that her insurance coverage is currently not active. Patient continue to state that she will contact the sleep center once she has reestablished with a health insurance provider.

## 2021-06-08 NOTE — TELEPHONE ENCOUNTER
Patient would like a Mammo order placed. Said she would mind having a virtual appointment but would like Mammo done ahead of time. Notified that I would check with provider and wouldn't be addressed until Tuesday. Patient was ok with that as Monday is a holiday.    Cynthia HARGROVE CMA ([facility])

## 2021-06-08 NOTE — TELEPHONE ENCOUNTER
Reached out to Brittany and left message regarding their upcoming appointment.    Please let the patient or family know that [facility] has moved to a curbside check in process. When the patient arrives at the clinic, please pull up to the front door, as directed by the signs. Someone from the clinic will greet you when you park by the front door.    If the patient has questions or needs to complete pre-registration, please call [phone] between the hours of 8 am and 4 pm.

## 2021-06-08 NOTE — PROGRESS NOTES
Preoperative Exam    Scheduled Procedure: Breast implant removed- left and right  Surgery Date:  6/4/2020  Surgery Location: [facility] [place] FAX:     Surgeon:  Dr. Malave     Assessment/Plan:     Brittany was seen today for pre-op exam.    Pre-operative examination  -     Hemoglobin  -     Pregnancy, Urine    Breast pain    No contraindications or significant risk factors to planned procedure.  Patient will complete course of Keflex as prescribed by her breast surgeon.  Will check urine pregnancy test and hemoglobin today.    Surgical Procedure Risk: Low (reported cardiac risk generally < 1%)  Have you had prior anesthesia?: Yes  Have you or any family members had a previous anesthesia reaction:  No  Do you or any family members have a history of a clotting or bleeding disorder?: No  Cardiac Risk Assessment: no increased risk for major cardiac complications    APPROVAL GIVEN to proceed with proposed procedure, without further diagnostic evaluation    Functional Status: Independent  Patient plans to recover at home with family.     Subjective:      Brittany Pickard is a 40 y.o. female who presents for a preoperative consultation.  She had a bilateral breast implants placed in 2015.  She has had problems with pain and discomfort and breast cysts and would like to have the implants removed.  She is currently on cephalexin for treatment of possible breast infection.  She is otherwise healthy and has no chronic medical problems.  We reviewed medications and allergies and updated the chart.  She has no other concerns or questions today.    All other systems reviewed and are negative, other than those listed in the HPI.    Pertinent History  Do you have difficulty breathing or chest pain after walking up a flight of stairs: No  History of obstructive sleep apnea: No  Steroid use in the last 6 months: No  Frequent Aspirin/NSAID use: Yes: uses ibuprofen for back pain   Prior Blood Transfusion: No  Prior Blood  Transfusion Reaction: No  If for some reason prior to, during or after the procedure, if it is medically indicated, would you be willing to have a blood transfusion?:  There is no transfusion refusal.    Current Outpatient Medications   Medication Sig Dispense Refill     cephalexin (KEFLEX) 500 MG capsule        cholecalciferol, vitamin D3, (VITAMIN D3 ORAL) Take 4,000 Int'l Units by mouth daily.        ibuprofen (ADVIL,MOTRIN) 200 MG tablet Take 200 mg by mouth every 6 (six) hours as needed for pain.       No current facility-administered medications for this visit.         Allergies   Allergen Reactions     Melatonin Hives     Other Allergy (See Comments)      Melatonin TABS, 2013.         Patient Active Problem List   Diagnosis     Allergic Rhinitis     Insomnia     Vitamin D Deficiency       History reviewed. No pertinent past medical history.    Past Surgical History:   Procedure Laterality Date     AUGMENTATION MAMMAPLASTY        SECTION, CLASSIC  2002     [place]  DELIVERY ONLY      Description:  Section;  Recorded: 2011;       Social History     Socioeconomic History     Marital status: Single     Spouse name: Not on file     Number of children: Not on file     Years of education: Not on file     Highest education level: Not on file   Occupational History     Not on file   Social Needs     Financial resource strain: Not on file     Food insecurity     Worry: Not on file     Inability: Not on file     Transportation needs     Medical: Not on file     Non-medical: Not on file   Tobacco Use     Smoking status: Never Smoker     Smokeless tobacco: Never Used   Substance and Sexual Activity     Alcohol use: Never     Drug use: Never     Sexual activity: Not Currently     Partners: Male     Birth control/protection: None   Lifestyle     Physical activity     Days per week: Not on file     Minutes per session: Not on file     Stress: Not on file   Relationships     Social connections      Talks on phone: Not on file     Gets together: Not on file     Attends Faith service: Not on file     Active member of club or organization: Not on file     Attends meetings of clubs or organizations: Not on file     Relationship status: Not on file     Intimate partner violence     Fear of current or ex partner: Not on file     Emotionally abused: Not on file     Physically abused: Not on file     Forced sexual activity: Not on file   Other Topics Concern     Not on file   Social History Narrative    ** Merged History Encounter **            Patient Care Team:  Liberty Fonseca MD as PCP - General (Family Medicine)  Tanya Peck MD (Obstetrics and Gynecology)  Liberty Fonseca MD as Assigned PCP          Objective:     Vitals:    05/29/20 1054   BP: 116/82   Pulse: 82   SpO2: 96%   Weight: 150 lb 11.2 oz (68.4 kg)         Physical Exam:  Physical Examination: General appearance - alert, well appearing, and in no distress  Mental status - alert, oriented to person, place, and time  Eyes - pupils equal and reactive, extraocular eye movements intact  Ears - bilateral TM's and external ear canals normal  Nose - normal and patent, no erythema, discharge or polyps  Mouth - mucous membranes moist, pharynx normal without lesions  Neck - supple, no significant adenopathy  Chest - clear to auscultation, no wheezes, rales or rhonchi, symmetric air entry  Heart - normal rate, regular rhythm, normal S1, S2, no murmurs, rubs, clicks or gallops  Abdomen - soft, nontender, nondistended, no masses or organomegaly  Breasts - there is induration right lateral breast associated with mild tenderness with palpation. Bilateral implants present  Neurological - alert, oriented, normal speech, no focal findings or movement disorder noted  Extremities - peripheral pulses normal, no pedal edema, no clubbing or cyanosis  Skin - normal coloration and turgor, no rashes, no suspicious skin lesions  noted      There are no Patient Instructions on file for this visit.        Labs:  Labs pending at this time.  Results will be reviewed when available.    Immunization History   Administered Date(s) Administered     Influenza, Seasonal, Inj PF IIV3 12/13/2011     Influenza, seasonal,quad inj 6-35 mos 09/30/2014     Influenza,seasonal quad, PF 11/16/2013     Influenza,seasonal quad, PF, =/> 6months 04/25/2019     Influenza,seasonal,quad inj =/> 6months 10/28/2015, 11/05/2016, 10/05/2017, 10/07/2019     Tdap 06/08/2011           Electronically signed by Kourtney Avila MD 05/29/20 10:58 AM

## 2021-06-10 NOTE — PROGRESS NOTES
37-year-old female presents with a few days of urinary frequency without urgency or dysuria.  Urinalysis was unremarkable.  I will send this for urine culture.  A1c was normal.  She does not have any vaginal symptoms or concerns for sexually transmitted diseases.  I suspect her urinary frequency may be related to increase in fluid intake.  Monitor her symptoms for now.  She does have a history of vitamin D insufficiency and is requesting to get her vitamin D level checked today.  I will communicate the results to the patient and further management will depend on her results.  Patient was agreeable to this plan      ASSESSMENT/PLAN:  1. Frequency of urination  - Urinalysis-UC if Indicated  - Culture, Urine  - Glycosylated Hemoglobin A1c    2. Vitamin D insufficiency  - Vitamin D, Total (25-Hydroxy)      Orders Placed This Encounter   Procedures     Culture, Urine     Urinalysis-UC if Indicated     Glycosylated Hemoglobin A1c     Vitamin D, Total (25-Hydroxy)       CHIEF COMPLAINT:  Chief Complaint   Patient presents with     frequency urination       HISTORY OF PRESENT ILLNESS:  Brittany is a 37 y.o. female presenting to the clinic today for urinary frequency. This has been ongoing for 2 days. She does drink a lot of water normally. No dysuria. She feels pressure over her bladder. Maybe some mild back pain. Her last period was 5/11/2017. She is not using any birth control. Denies possible pregnancy or STD's. She denies excessive thirst or hunger. She says she drinks a lot of water to stay hydrated.     REVIEW OF SYSTEMS:   No nausea, fevers, chills. All other systems are negative.    PFSH:  No new history.     TOBACCO USE:  History   Smoking Status     Never Smoker   Smokeless Tobacco     Not on file       VITALS:  Vitals:    05/18/17 1124   BP: 104/70   Patient Site: Left Arm   Patient Position: Sitting   Cuff Size: Adult Regular   Pulse: 60   Weight: 144 lb 2 oz (65.4 kg)     Wt Readings from Last 3 Encounters:    05/18/17 144 lb 2 oz (65.4 kg)   09/15/16 138 lb 4 oz (62.7 kg)   01/25/16 129 lb 6.4 oz (58.7 kg)       PHYSICAL EXAM:  Constitutional: Patient is oriented to person, place, and time. Patient appears well-developed and well-nourished. No distress.   Cardiovascular: Normal rate.  Pulmonary/Chest: Effort normal. No respiratory distress.   Neurological: Patient is alert and oriented to person, place, and time.      Results for orders placed or performed in visit on 05/18/17   Urinalysis-UC if Indicated   Result Value Ref Range    Color, UA Yellow Colorless, Yellow, Straw, Light Yellow    Clarity, UA Clear Clear    Glucose, UA Negative Negative    Bilirubin, UA Negative Negative    Ketones, UA Negative Negative    Specific Gravity, UA 1.010 1.005 - 1.030    Blood, UA Negative Negative    pH, UA 6.5 5.0 - 8.0    Protein, UA Negative Negative mg/dL    Urobilinogen, UA 0.2 E.U./dL 0.2 E.U./dL, 1.0 E.U./dL    Nitrite, UA Negative Negative    Leukocytes, UA Negative Negative   Glycosylated Hemoglobin A1c   Result Value Ref Range    Hemoglobin A1c 5.6 3.5 - 6.1 %           ADDITIONAL HISTORY SUMMARIZED (2): None.  DECISION TO OBTAIN EXTRA INFORMATION (1): None.   RADIOLOGY TESTS (1): None.  LABS (1): Ordered and reviewed labs.  MEDICINE TESTS (1): None.  INDEPENDENT REVIEW (2 each): None.     The visit lasted a total of 7 minutes face to face with the patient. Over 50% of the time was spent counseling and educating the patient about urinary symptoms etiology.    ICarolann, am scribing for and in the presence of, Dr. Malave.    Dr. Frieda COTTRELL, personally performed the services described in this documentation, as scribed by Carolann Chiu in my presence, and it is both accurate and complete.    MEDICATIONS:  Current Outpatient Prescriptions   Medication Sig Dispense Refill     cholecalciferol, vitamin D3, (VITAMIN D3) 4,000 unit cap Take by mouth.       MULTIVITAMIN ORAL Take by mouth.       VITAMIN B  COMPLEX ORAL Take by mouth.       ranitidine (ZANTAC) 150 MG tablet Take 1 tablet (150 mg total) by mouth 2 (two) times a day. 60 tablet 0     sertraline (ZOLOFT) 25 MG tablet TAKE 1 TABLET(25 MG) BY MOUTH DAILY 30 tablet 6     zolpidem (AMBIEN) 5 MG tablet Take 1.5 to 2 tablets at bedtime 60 tablet 0     No current facility-administered medications for this visit.        Total data points: 1

## 2021-06-10 NOTE — TELEPHONE ENCOUNTER
Wellness Screening Tool  Symptom Screening:  Do you have one of the following NEW symptoms:    Fever (subjective or >100.0)?  No    A new cough?  No    Shortness of breath?  No     Chills? No     New loss of taste or smell? No     Generalized body aches? No     New persistent headache? No     New sore throat? No     Nausea, vomiting, or diarrhea?  No    Within the past 3 weeks, have you been exposed to someone with a known positive illness below:    COVID-19 (known or suspected)?  No    Chicken pox?  No    Mealses?  No    Pertussis?  No    Patient notified of visitor policy- They may have one person accompany them to their appointment, but they will need to wear a mask and will be screened upon arrival for symptoms: Yes  Pt informed to wear a mask: Yes  Pt notified if they develop any symptoms listed above, prior to their appointment, they are to call the clinic directly at [phone] for further instructions.  Yes  Patient's appointment status: Patient will be seen in clinic as scheduled on Thurs 8-20-20 @ 1350 in  clinic with Dr. Swartz.  mg

## 2021-06-11 NOTE — PROGRESS NOTES
"ASSESSMENT/PLAN:  1. Back pain  3 days, no h/o trauma  Suspect muscle skeletal.  She may take ibuprofen 800 mg 3 times a day with food as needed.  Will add Flexeril.  She had a recent history of urinary tract infection with a urine culture for test of cure.  She was agreeable to this plan.  We reviewed warning symptoms.  She will call if she has further questions or concerns.  - Culture, Urine  - cyclobenzaprine (FLEXERIL) 10 MG tablet; Take 1 tablet (10 mg total) by mouth 3 (three) times a day as needed for muscle spasms.  Dispense: 30 tablet; Refill: 0      Orders Placed This Encounter   Procedures     Culture, Urine     CHIEF COMPLAINT:  Chief Complaint   Patient presents with     Back Pain     Lower Back Pain for 3 days       HISTORY OF PRESENT ILLNESS:  Brittany is a 37 y.o. female presenting to the clinic today for back pain. She has been having back pain for the past 3 days. It is located in her mid to lower back. It was not injury related. This is not a recurrent issue for her. She could not sleep last night because of pain. She rates her pain at a 8/10. She did take some Advil with minimal relief. No abdominal pain.     REVIEW OF SYSTEMS:   Denies any urinary frequency, urgency, dysuria, fevers or chills. All other systems are negative.    PFSH:  No new history.     TOBACCO USE:  History   Smoking Status     Never Smoker   Smokeless Tobacco     Not on file       VITALS:  Vitals:    07/03/17 1541   BP: 112/60   Patient Site: Left Arm   Patient Position: Sitting   Cuff Size: Adult Regular   Pulse: 68   Weight: 142 lb (64.4 kg)   Height: 5' 4.75\" (1.645 m)     Wt Readings from Last 3 Encounters:   07/03/17 142 lb (64.4 kg)   05/18/17 144 lb 2 oz (65.4 kg)   09/15/16 138 lb 4 oz (62.7 kg)       PHYSICAL EXAM:  Constitutional: Patient is oriented to person, place, and time. Patient appears well-developed and well-nourished. No distress.   Cardiovascular: Normal rate.  Pulmonary/Chest: Effort normal. No respiratory " distress.   Neurological: Patient is alert and oriented to person, place, and time.  Back: tenderness to palpation to right lower back. No tenderness to palpation to spinous or perispinous processes. Negative straight leg raising.     Results for orders placed or performed in visit on 05/18/17   Culture, Urine   Result Value Ref Range    Culture 10,000-50,000 col/ml Enterococcus species (!)    Urinalysis-UC if Indicated   Result Value Ref Range    Color, UA Yellow Colorless, Yellow, Straw, Light Yellow    Clarity, UA Clear Clear    Glucose, UA Negative Negative    Bilirubin, UA Negative Negative    Ketones, UA Negative Negative    Specific Gravity, UA 1.010 1.005 - 1.030    Blood, UA Negative Negative    pH, UA 6.5 5.0 - 8.0    Protein, UA Negative Negative mg/dL    Urobilinogen, UA 0.2 E.U./dL 0.2 E.U./dL, 1.0 E.U./dL    Nitrite, UA Negative Negative    Leukocytes, UA Negative Negative   Glycosylated Hemoglobin A1c   Result Value Ref Range    Hemoglobin A1c 5.6 3.5 - 6.1 %   Vitamin D, Total (25-Hydroxy)   Result Value Ref Range    Vitamin D, Total (25-Hydroxy) 33.9 30.0 - 80.0 ng/mL         ADDITIONAL HISTORY SUMMARIZED (2): None.  DECISION TO OBTAIN EXTRA INFORMATION (1): None.   RADIOLOGY TESTS (1): None.  LABS (1): Ordered labs today.  MEDICINE TESTS (1): None.  INDEPENDENT REVIEW (2 each): None.     ICarolann, am scribing for and in the presence of, Dr. Malave.    Liberty COTTRELL MD , personally performed the services described in this documentation, as scribed by Carolann Chiu in my presence, and it is both accurate and complete.    MEDICATIONS:  Current Outpatient Prescriptions   Medication Sig Dispense Refill     cholecalciferol, vitamin D3, (VITAMIN D3) 4,000 unit cap Take by mouth.       MULTIVITAMIN ORAL Take by mouth.       ranitidine (ZANTAC) 150 MG tablet Take 1 tablet (150 mg total) by mouth 2 (two) times a day. 60 tablet 0     VITAMIN B COMPLEX ORAL Take by  mouth.       cyclobenzaprine (FLEXERIL) 10 MG tablet Take 1 tablet (10 mg total) by mouth 3 (three) times a day as needed for muscle spasms. 30 tablet 0     sertraline (ZOLOFT) 25 MG tablet TAKE 1 TABLET(25 MG) BY MOUTH DAILY 30 tablet 6     zolpidem (AMBIEN) 5 MG tablet Take 1.5 to 2 tablets at bedtime 60 tablet 0     No current facility-administered medications for this visit.        Total data points: 1

## 2021-06-13 NOTE — PROGRESS NOTES
Subjective:      Patient ID: Brittany Pickard is a 37 y.o. female.    Chief Complaint:    HPI Brittany Pickard is a 37 y.o. female who presents today complaining of pain with urination and back pain x 2 weeks. She has had these sxs before in the and she had a UTI. Her back pain low bandlike pain on the left and the right. She also feels a pressure when her bladder is full. She denies any vaginal complaints, nausea, vomiting, or fevers. She feels like it is much worse when her bladder is full. She reports drinking a lot of water, so she does go to the bathroom frequently, but she has not noticed a change in this. She denies any hematuria or odor to her urine.  She reports that she is not currently sexually active and is not interested in STD or pregnancy testing today.  She also reports that her bowel movements have been regular with no constipation or diarrhea.        Past Surgical History:   Procedure Laterality Date     AUGMENTATION MAMMAPLASTY       AL  DELIVERY ONLY      Description:  Section;  Recorded: 2011;       No family history on file.    Social History   Substance Use Topics     Smoking status: Never Smoker     Smokeless tobacco: None     Alcohol use None       Review of Systems   Constitutional: Negative for fever.   Gastrointestinal: Positive for abdominal pain. Negative for constipation, diarrhea, nausea and vomiting.   Genitourinary: Positive for dysuria. Negative for frequency, hematuria, vaginal bleeding, vaginal discharge and vaginal pain.       Objective:     /70 (Patient Site: Right Arm, Patient Position: Sitting, Cuff Size: Adult Regular)  Pulse 81  Temp 98.9  F (37.2  C) (Oral)   Resp 20  Wt 144 lb (65.3 kg)  LMP 2017 (Exact Date)  SpO2 96%  Breastfeeding? No  BMI 24.15 kg/m2    Physical Exam   Constitutional: She appears well-developed and well-nourished. No distress.   HENT:   Head: Normocephalic and atraumatic.   Right Ear: External ear normal.   Left Ear:  External ear normal.   Eyes: Conjunctivae are normal.   Cardiovascular: Normal rate, regular rhythm and normal heart sounds.  Exam reveals no gallop and no friction rub.    No murmur heard.  Pulmonary/Chest: Effort normal and breath sounds normal. No respiratory distress. She has no wheezes. She has no rales.   Abdominal: Soft. There is tenderness in the epigastric area and suprapubic area. There is CVA tenderness. There is no rigidity, no guarding, no tenderness at McBurney's point and negative Trujillo's sign.   Skin: She is not diaphoretic.   Psychiatric: She has a normal mood and affect. Her behavior is normal. Judgment and thought content normal.   Nursing note and vitals reviewed.    Labs:  Recent Results (from the past 24 hour(s))   Urinalysis-UC if Indicated   Result Value Ref Range    Color, UA Yellow Colorless, Yellow, Straw, Light Yellow    Clarity, UA Clear Clear    Glucose, UA Negative Negative    Bilirubin, UA Negative Negative    Ketones, UA Negative Negative    Specific Gravity, UA <=1.005 1.005 - 1.030    Blood, UA Negative Negative    pH, UA 6.0 5.0 - 8.0    Protein, UA Negative Negative mg/dL    Urobilinogen, UA 0.2 E.U./dL 0.2 E.U./dL, 1.0 E.U./dL    Nitrite, UA Negative Negative    Leukocytes, UA Negative Negative     Clinical Decision Making:  Patient was wondering if she could get an antibiotic even though there were no signs of an infection on her UA because in the past that is what her primary care provider has done.  I looked back at her medical history and there were instances where her normal UA was negative for any growth on the culture, for that reason I will not treat until urine culture is back.  The patient agreed to this plan.  She is not interested in STD or pregnancy testing today.  She was instructed to follow-up for further workup if her urine culture came back negative and her symptoms persisted.    Assessment:     Procedures    1. Dysuria  Urinalysis-UC if Indicated   2.  Suspected UTI  Culture, Urine         Patient Instructions   1. We'll wait for the urine culture results before starting treatment.   2. If Urine culture is negative then follow up either here or with  for further workup of your symptoms.

## 2021-06-15 NOTE — TELEPHONE ENCOUNTER
Please assist the patient with a video appointment with me, preferably during my virtual times (all day Monday, Tuesday after 4 PM, and Thursday after 4 PM).  Thank you.  Have an awesome day.

## 2021-06-15 NOTE — PROGRESS NOTES
Brittany Pickard is a 40 y.o. female who is being evaluated via a billable video visit.      How would you like to obtain your AVS? MyChart.    Will anyone else be joining your video visit? No    Video Start Time: 7:46 AM    Assessment & Plan     Brittany was seen today for back pain and vaginal itchy and discharge.    Diagnoses and all orders for this visit:    Chronic bilateral low back pain without sciatica  -     cyclobenzaprine (FLEXERIL) 10 MG tablet; Take 1 tablet (10 mg total) by mouth 3 (three) times a day as needed for muscle spasms.  She will look into chiropractor care  F/u prn    Acute vaginitis  -     fluconazole (DIFLUCAN) 150 MG tablet; Take 1 tablet (150 mg total) by mouth once for 1 dose.  F/u prn    Liberty Sapp MD  [facility]   Brittany Pickard is 40 y.o. and presents today for the following health issues   HPI     Chronic back pain x 3-4 years  On/off  No injury history, no recent injury  Lower back pain, 5/10, over the last few days  Worse with bending down and cleaning  No radiation  No bowel/bladder  Warm shower has helped    Vaginal irritation x 2 days  No abnormal discharge  No odor      Objective       Vitals:  No vitals were obtained today due to virtual visit.    Physical Exam  Constitutional: Patient is oriented to person, place, and time. Patient appears well-developed and well-nourished. No distress.   Head: Normocephalic and atraumatic.   Right Ear: External ear normal.   Left Ear: External ear normal.   Nose: Nose normal.   Eyes: Conjunctivae and EOM are normal. Right eye exhibits no discharge. Left eye exhibits no discharge. No scleral icterus.   Neurological: Patient is alert and oriented to person, place, and time. No cranial nerve deficit. Coordination normal.   Skin: No rash noted. Patient is not diaphoretic. No erythema. No pallor.        Video-Visit Details    Type of service:  Video Visit    Video End Time (time video  stopped): 8:13 AM  Originating Location (pt. Location): Home    Distant Location (provider location):  [facility]     Platform used for Video Visit: Chi

## 2021-06-16 PROBLEM — M54.50 CHRONIC BILATERAL LOW BACK PAIN WITHOUT SCIATICA: Status: ACTIVE | Noted: 2021-05-27

## 2021-06-16 PROBLEM — K59.01 SLOW TRANSIT CONSTIPATION: Status: ACTIVE | Noted: 2021-05-27

## 2021-06-16 PROBLEM — G89.29 CHRONIC BILATERAL LOW BACK PAIN WITHOUT SCIATICA: Status: ACTIVE | Noted: 2021-05-27

## 2021-06-16 PROBLEM — R53.82 CHRONIC FATIGUE: Status: ACTIVE | Noted: 2021-05-27

## 2021-06-16 NOTE — TELEPHONE ENCOUNTER
Telephone Encounter by Viridiana Pickering at 4/26/2019 11:28 AM     Author: Viridiana Pickering Service: -- Author Type: --    Filed: 4/26/2019 11:29 AM Encounter Date: 4/25/2019 Status: Signed    : Viridiana Pickering       Insurance states medication is covered without a prior authorization.  Information provided to pharmacy to call help desk with further issues.

## 2021-06-17 NOTE — TELEPHONE ENCOUNTER
Please assist the patient with a video appointment with me. Ok to double book today.  Thank you.  Have an awesome day.

## 2021-06-19 NOTE — LETTER
Letter by Liberty Fonseca MD at      Author: Liberty Fonseca MD Service: -- Author Type: --    Filed:  Encounter Date: 4/25/2019 Status: (Other)         April 25, 2019     Patient: Brittany Pickard   YOB: 1980   Date of Visit: 4/25/2019       To Whom It May Concern:    Melly is a patient of this clinic.  I service her primary care physician.  She had a recent sent chest x-ray that did not show active pulmonary disease.  She does not have any pulmonary symptoms.  She is free of communicable diseases.    If you have any questions or concerns, please don't hesitate to call.    Sincerely,        Electronically signed by Liberty Sapp MD

## 2021-06-19 NOTE — LETTER
Letter by Liberty Fonseca MD at      Author: Liberty Fonseca MD Service: -- Author Type: --    Filed:  Encounter Date: 10/7/2019 Status: Signed         October 7, 2019     Patient: Brittany Pickard   YOB: 1980   Date of Visit: 10/7/2019       To Whom It May Concern:    Ms. Pickard had her influenza  shot today.    If you have any questions or concerns, please don't hesitate to call.    Sincerely,        Electronically signed by Liberty Sapp MD

## 2021-06-20 ENCOUNTER — HEALTH MAINTENANCE LETTER (OUTPATIENT)
Age: 41
End: 2021-06-20

## 2021-06-20 NOTE — PROGRESS NOTES
ASSESSMENT/PLAN:    Sorethroat,Sinusitis  Pleasant 38 y.o.  female presented with 2-3 weeks of sore throat and facial pain and pressure.  Examination revealed erythematous tympanic membrane bilaterally with erythematous peritonsillar area.  Rapid strep is negative.  We will treat her with azithromycin for sinusitis.  The patient may continue with OTC symptomatic treatment.  Rest, hydration, nutritional support, and time were counseled.  Follow up if the patient is not better in 1-2 weeks.  The patient verbalized understanding and agreed with the plan.  -     Rapid Strep A Screen-Throat  -     Group A Strep, RNA Direct Detection, Throat  -     azithromycin (ZITHROMAX Z-MADDIE) 250 MG tablet; Take 2 tablets (500 mg) on  Day 1,  followed by 1 tablet (250 mg) once daily on Days 2 through 5.    SUBJECTIVE:    Brittany Pickard is a 38 y.o. female who came in today for 2-3 weeks of facial pain pressure, ear pain, plugged sensation of both her ears, fatigue, sore throat, chills, on and off sensation of feeling feverish.  The patient has tried the counter treatment and rest without any significant benefit.  In fact, her symptoms are worsening.  She denies cough, rhinorrhea, shortness of breath, dysuria, rash.    Review of Systems (except those mentioned above)  Constitutional: Negative.   HENT: Negative.   Eyes: Negative.   Respiratory: Negative.   Cardiovascular: Negative.   Gastrointestinal: Negative.   Endocrine: Negative.   Genitourinary: Negative.   Musculoskeletal: Negative.   Skin: Negative.   Allergic/Immunologic: Negative.   Neurological: Negative.   Hematological: Negative.   Psychiatric/Behavioral: Negative.     Patient Active Problem List    Diagnosis Date Noted     Allergic Rhinitis      Insomnia      Vitamin D Deficiency      Headache      Allergies   Allergen Reactions     Other Allergy (See Comments)      Melatonin TABS, 09/01/2013.       Current Outpatient Prescriptions   Medication Sig Dispense Refill      cholecalciferol, vitamin D3, (VITAMIN D3) 4,000 unit cap Take by mouth.       cyclobenzaprine (FLEXERIL) 10 MG tablet TAKE 1 TABLET(10 MG) BY MOUTH THREE TIMES DAILY AS NEEDED FOR MUSCLE SPASMS 30 tablet 0     MULTIVITAMIN ORAL Take by mouth.       traZODone (DESYREL) 50 MG tablet Take 1 tablet (50 mg total) by mouth at bedtime. 30 tablet 8     VITAMIN B COMPLEX ORAL Take by mouth.       azithromycin (ZITHROMAX Z-MADDIE) 250 MG tablet Take 2 tablets (500 mg) on  Day 1,  followed by 1 tablet (250 mg) once daily on Days 2 through 5. 6 tablet 0     LESSINA 0.1-20 mg-mcg per tablet        topiramate (TOPAMAX) 25 MG tablet Take 1 tablet (25 mg total) by mouth at bedtime. 30 tablet 0     No current facility-administered medications for this visit.      No past medical history on file.  Past Surgical History:   Procedure Laterality Date     AUGMENTATION MAMMAPLASTY       ME  DELIVERY ONLY      Description:  Section;  Recorded: 2011;     Social History     Social History     Marital status: Single     Spouse name: N/A     Number of children: N/A     Years of education: N/A     Social History Main Topics     Smoking status: Never Smoker     Smokeless tobacco: Never Used     Alcohol use None     Drug use: None     Sexual activity: Not Asked     Other Topics Concern     None     Social History Narrative    ** Merged History Encounter **          No family history on file.      OBJECTIVE:    Vitals:    18 1307   BP: 108/84   Pulse: 72   Temp: 98.3  F (36.8  C)   TempSrc: Oral   Weight: 150 lb 9 oz (68.3 kg)     Body mass index is 25.25 kg/(m^2).    Physical Exam:  Constitutional: Patient was oriented to person, place, and time. Patient appeared well-developed and well-nourished. No distress.   Head: Normocephalic and atraumatic.   Right Ear: External ear normal.  Erythematous tympanic membrane that is mobile to pneumatic insufflation  Left Ear: External ear normal. Erythematous tympanic membrane that is  mobile to pneumatic insufflation  Nose: Nose normal.   Mouth/Throat: Oropharynx was erythematous without exudate or peritonsillar swelling.   Eyes: Conjunctivae and EOM were normal. Pupils were equal, round, and reactive to light. Right eye exhibited no discharge. Left eye exhibited no discharge. No scleral icterus.   Neck: Neck supple. No JVD present. No tracheal deviation present. No thyromegaly present.   Lymphadenopathy:  Patient has no cervical adenopathy.   Cardiovascular: Normal rate, regular rhythm, normal heart sounds and intact distal pulses. No murmur heard.   Pulmonary/Chest: Effort normal and breath sounds normal. No stridor. No respiratory distress. Patient had no wheezes, no rales, exhibits no tenderness.   Skin: Skin was warm and dry. No rash noted. Patient was not diaphoretic. No erythema. No pallor.       Results for orders placed or performed in visit on 09/25/18   Rapid Strep A Screen-Throat   Result Value Ref Range    Rapid Strep A Antigen No Group A Strep detected, presumptive negative No Group A Strep detected, presumptive negative

## 2021-06-20 NOTE — LETTER
Letter by Kristi Resendez LPN at      Author: Kristi Resendez LPN Service: -- Author Type: --    Filed:  Encounter Date: 3/4/2020 Status: (Other)         Brittanycompa Pickard  [address] [facility] [address]             March 4, 2020         Dear Ms. Pickard,     If you decide to follow through with your in lab sleep study, please call [phone] and I will help you  schedule that appointment.  Thank you for letting [facility] participate in your  care.    Please call with questions or contact us using [employer]    Sincerely,        Electronically signed by Kristi Resendez LPN

## 2021-06-25 NOTE — PROGRESS NOTES
Progress Notes by Liberty Fonseca MD at 10/5/2017 10:20 AM     Author: Liberty Fonseca MD Service: -- Author Type: Physician    Filed: 10/5/2017 11:30 AM Encounter Date: 10/5/2017 Status: Signed    : Liberty Fonseca MD (Physician)         ASSESSMENT/PLAN:  37 y.o. Female with chronic insomnia and fatigue.  She tried ambien once which gave her vivid dreams and she stopped.  Will trial topamax for sleep, headache, and may facilitate a little bit of weight loss.  Will also check labs.  Will communicate results to her    Need for immunization against influenza  -     Influenza, Seasonal,Quad Inj, 36+ MOS    Fatigue  -     HM2(CBC w/o Differential)  -     Comprehensive Metabolic Panel  -     Vitamin D, Total (25-Hydroxy)  -     Thyroid Stimulating Hormone (TSH)  -     Vitamin B12    Insomnia  -     topiramate (TOPAMAX) 25 MG tablet; Take 1 tablet (25 mg total) by mouth at bedtime.  Dispense: 30 tablet; Refill: 0    Headache  -     topiramate (TOPAMAX) 25 MG tablet; Take 1 tablet (25 mg total) by mouth at bedtime.  Dispense: 30 tablet; Refill: 0      Patient Instructions       Sleep and Women     Taking a warm bath can help you relax before bed.   Do you have trouble sleeping? Many women do. Some life changes are unique to women, such as pregnancy or menopause. These changes, along with the demands of family and work, can affect your health and your sleep. Talk to your healthcare provider if your sleep problems last more than a few weeks.  What affects your sleep  Many factors can affect how well you sleep. Hormone changes can cause mood swings, insomnia, and other problems. Balancing many roles, such as mother, partner, worker, and caretaker can also take a toll on your sleep. Worries about these competing demands can keep you awake at night. And, of course, with so much to do, who even has time for sleep? But you need to sleep well to be healthy and have energy. The good  news is there are steps you can take to sleep better.  Tips for better sleep  Here are some steps you can take to sleep better:    Keep a regular sleep schedule. Go to bed and get up at the same time each day.    Exercise regularly. But avoid strenuous exercise 2 hours to 4 hours before bedtime.    Learn to relax. Try a warm bath, yoga, or meditation. Reading a book or listening to music can help you relax before bedtime.    Create a comfortable setting for sleep. Make sure the room is quiet, dark, and not too hot or too cold.    Use your bed only for sleep and sex.    Avoid or limit caffeine, nicotine, and alcohol.    Avoid naps after 3 p.m.  Resources  American Academy of Sleep Medicine  National Sleep Foundation  [phone]   Date Last Reviewed: 5/12/2015 2000-2016 [employer]. [address] [place] [address]. All rights reserved. This information is not intended as a substitute for professional medical care. Always follow your healthcare professional's instructions.              Orders Placed This Encounter   Procedures   ? Influenza, Seasonal,Quad Inj, 36+ MOS   ? HM2(CBC w/o Differential)   ? Comprehensive Metabolic Panel   ? Vitamin D, Total (25-Hydroxy)   ? Thyroid Stimulating Hormone (TSH)   ? Vitamin B12           CHIEF COMPLAINT:  Chief Complaint   Patient presents with   ? Insomnia   ? Fatigue     not sleeping well x 1 month       HISTORY OF PRESENT ILLNESS:  Brittany is a 37 y.o. female presenting to the clinic today for insomnia. She does have a history of fatigue and insomnia for the past few years. She did try Ambien, but she had bad dreams, and stopped. She also has tried Tylenol PM without success. She now has not been able to sleep well for months. She is having fatigue during the day. She has had some associated headaches. She goes to bed around 8:30-9, and falls asleep well. She does not stay asleep, and wakes early and does not fall back to sleep. Denies any anxiety,  depression, excessive worrying. She does not feel overly stressed. She does not snore. Denies dysphagia or odynophagia. She takes an occasional B12 supplement.     REVIEW OF SYSTEMS:   Constitutional: Negative.   HENT: Negative.   Eyes: Negative.   Respiratory: Negative.   Cardiovascular: Negative.   Gastrointestinal: Negative.   Endocrine: Negative.   Genitourinary: Negative.   Musculoskeletal: Negative.   Skin: Negative.   Allergic/Immunologic: Negative.   Neurological: Negative.   Hematological: Negative.   Psychiatric/Behavioral: Negative.   All other systems are negative.    PFSH:  No new history.     TOBACCO USE:  History   Smoking Status   ? Never Smoker   Smokeless Tobacco   ? Not on file       VITALS:  Vitals:    10/05/17 1031   BP: 100/74   Pulse: 64   Weight: 144 lb (65.3 kg)     Wt Readings from Last 3 Encounters:   10/05/17 144 lb (65.3 kg)   09/15/17 144 lb (65.3 kg)   07/03/17 142 lb (64.4 kg)       PHYSICAL EXAM:  Constitutional: Patient is oriented to person, place, and time. Patient appears well-developed and well-nourished. No distress.   Cardiovascular: Normal rate.  Pulmonary/Chest: Effort normal. No respiratory distress.   Neurological: Patient is alert and oriented to person, place, and time.  Mouth/Throat: Oropharynx is clear and moist. No oropharyngeal exudate.       Results for orders placed or performed in visit on 10/05/17   HM2(CBC w/o Differential)   Result Value Ref Range    WBC 7.0 4.0 - 11.0 thou/uL    RBC 4.15 3.80 - 5.40 mill/uL    Hemoglobin 12.4 12.0 - 16.0 g/dL    Hematocrit 36.4 35.0 - 47.0 %    MCV 88 80 - 100 fL    MCH 29.8 27.0 - 34.0 pg    MCHC 34.0 32.0 - 36.0 g/dL    RDW 12.4 11.0 - 14.5 %    Platelets 231 140 - 440 thou/uL    MPV 7.7 7.0 - 10.0 fL           ADDITIONAL HISTORY SUMMARIZED (2): None.  DECISION TO OBTAIN EXTRA INFORMATION (1): None.   RADIOLOGY TESTS (1): None.  LABS (1): Ordered labs today.  MEDICINE TESTS (1): None.  INDEPENDENT REVIEW (2 each): None.      I, Carolann Chiu, am scribing for and in the presence of, Dr. Malave.    I, Liberty Sapp MD , personally performed the services described in this documentation, as scribed by Carolann Chiu in my presence, and it is both accurate and complete.    MEDICATIONS:  Current Outpatient Prescriptions   Medication Sig Dispense Refill   ? cholecalciferol, vitamin D3, (VITAMIN D3) 4,000 unit cap Take by mouth.     ? MULTIVITAMIN ORAL Take by mouth.     ? topiramate (TOPAMAX) 25 MG tablet Take 1 tablet (25 mg total) by mouth at bedtime. 30 tablet 0   ? VITAMIN B COMPLEX ORAL Take by mouth.       No current facility-administered medications for this visit.        Total data points: 1

## 2021-06-25 NOTE — PROGRESS NOTES
ASSESSMENT/PLAN:  Brittany was seen today for gastroesophageal reflux, alopecia and fatigue.    Diagnoses and all orders for this visit:    Primary insomnia  -     progesterone (PROMETRIUM) 100 MG capsule; Take 1 capsule (100 mg total) by mouth at bedtime.  Takes benadryl.  Tried trazodone, ambien, doxepin (but I don't think she gave them a fair try)  Discussed sleep hygiene and turn off electronics before bedtime  F/u in 1 month    Chronic fatigue  Continue with vitamin D.  We will check a vitamin D level today  Need to work on better sleep habit.  See sleep management above    Chronic bilateral low back pain without sciatica  -     traMADoL (ULTRAM) 50 mg tablet; Take 1 tablet (50 mg total) by mouth daily as needed for pain.  Stop Flexeril.  We will give her tramadol.  Caution can tramadol can cause sedation and constipation which are 2 of her current problems now    Abdominal pain, epigastric  Chronic  Discussed endoscopy but she declined  -     Comprehensive Metabolic Panel  -     Thyroid Cascade  -     HM2(CBC w/o Differential)  -     Lipase  -     H. pylori Antigen, Stool(HPSAG); Future    Slow transit constipation  Stop iron supplement  Continue with exercise, fiber, fluid, and over-the-counter stool softener    Hair loss  -     Comprehensive Metabolic Panel  -     Thyroid Cascade  -     HM2(CBC w/o Differential)  May need to see dermatology    Screening examination for pulmonary tuberculosis  -     XR Chest 2 Views; Future  History of positive mantoux.  No active symptoms    Screening cholesterol level  -     Lipid Cascade    Vitamin D insufficiency  -     Vitamin D, Total (25-Hydroxy)  Currently taking 5000 IU    SUBJECTIVE:    Brittany Pickard is a 41 y.o. female who came in today     GERD/abdominal pain  Epigastric pain and bloatiness  Chronic  Hasn't turn in stool sample for h pylori yet  Chronic anterior chest pain better since removal of her breast implants    Chr constipation  Taking iron supplement  Feeling  a bit joann now that she is supplementing with OTC fiber & stool softener    Alopecia  No h/o thyroid. No FMH of thyroid    Chr fatigue/insomnia  Labs in 2019 revealed low vitamin D  Taking 5000iu daily  adderall for off label use was discussed  Trouble with staying asleep.  Takes benadryl.  Tried trazodone, ambien, doxepin  Sleeps with TV in room & lots of electronics     Chronic back pain  Had elevated creatinine due to excessive NSAIDs    Had positive TB skin test  Born in [facility]  Thinks she may have gotten BCG vaccine  Had a negative CXR 2019    Review of Systems (except those mentioned above)  Constitutional: Negative.   HENT: Negative.   Eyes: Negative.   Respiratory: Negative.   Cardiovascular: Negative.   Gastrointestinal: Negative.   Endocrine: Negative.   Genitourinary: Negative.   Musculoskeletal: Negative.   Skin: Negative.   Allergic/Immunologic: Negative.   Neurological: Negative.   Hematological: Negative.   Psychiatric/Behavioral: Negative.     Patient Active Problem List    Diagnosis Date Noted     Chronic fatigue 05/27/2021     Chronic bilateral low back pain without sciatica 05/27/2021     Slow transit constipation 05/27/2021     Allergic Rhinitis      Insomnia      Vitamin D Deficiency      Allergies   Allergen Reactions     Melatonin Hives     Other Allergy (See Comments)      Melatonin TABS, 09/01/2013.       Current Outpatient Medications   Medication Sig Dispense Refill     cholecalciferol, vitamin D3, (VITAMIN D3 ORAL) Take 4,000 Int'l Units by mouth daily.        cyanocobalamin 1000 MCG tablet Take 1,000 mcg by mouth daily.       ferrous sulfate 65 mg elemental iron Take 1 tablet by mouth daily with breakfast.       multivitamin with minerals (THERA-M) 9 mg iron-400 mcg Tab tablet Take 1 tablet by mouth daily.       ferrous fumarate-vitamin C 200 mg (65 mg iron)-25 mg TbER Take by mouth.       progesterone (PROMETRIUM) 100 MG capsule Take 1 capsule (100 mg total) by mouth at bedtime. 30  capsule 0     traMADoL (ULTRAM) 50 mg tablet Take 1 tablet (50 mg total) by mouth daily as needed for pain. 30 tablet 0     No current facility-administered medications for this visit.      No past medical history on file.  Past Surgical History:   Procedure Laterality Date     AUGMENTATION MAMMAPLASTY      removed 2020      SECTION, CLASSIC  2002     [place]  DELIV[place]      Description:  Section;  Recorded: 2011;     Social History     Socioeconomic History     Marital status: Single     Spouse name: None     Number of children: None     Years of education: None     Highest education level: None   Occupational History     None   Social Needs     Financial resource strain: None     Food insecurity     Worry: None     Inability: None     Transportation needs     Medical: None     Non-medical: None   Tobacco Use     Smoking status: Never Smoker     Smokeless tobacco: Never Used   Substance and Sexual Activity     Alcohol use: Never     Drug use: Never     Sexual activity: Not Currently     Partners: Male     Birth control/protection: None   Lifestyle     Physical activity     Days per week: None     Minutes per session: None     Stress: None   Relationships     Social connections     Talks on phone: None     Gets together: None     Attends Judaism service: None     Active member of club or organization: None     Attends meetings of clubs or organizations: None     Relationship status: None     Intimate partner violence     Fear of current or ex partner: None     Emotionally abused: None     Physically abused: None     Forced sexual activity: None   Other Topics Concern     None   Social History Narrative    ** Merged History Encounter **          Family History   Problem Relation Age of Onset     Cancer Father      Hypertension Mother          OBJECTIVE:    Vitals:    21 1139   BP: 120/78   Patient Site: Left Arm   Patient Position: Sitting   Cuff Size: Adult Regular    Pulse: 68   Weight: 150 lb 8 oz (68.3 kg)     Body mass index is 25.24 kg/m .    Physical Exam:  Constitutional: Patient is oriented to person, place, and time. Patient appears well-developed and well-nourished. No distress.   Head: Normocephalic and atraumatic.   Right Ear: External ear normal.   Left Ear: External ear normal.   Eyes: Conjunctivae and EOM are normal. Right eye exhibits no discharge. Left eye exhibits no discharge. No scleral icterus.   Neurological: Patient is alert and oriented to person, place, and time.  Coordination normal.   Skin: No rash noted. Patient is not diaphoretic. No erythema. No pallor.

## 2021-06-26 NOTE — PROGRESS NOTES
Progress Notes by Liberty Fonseca MD at 2/15/2018  1:00 PM     Author: Liberty Fonseca MD Service: -- Author Type: Physician    Filed: 2/15/2018  2:08 PM Encounter Date: 2/15/2018 Status: Signed    : Liberty Fonseca MD (Physician)       ASSESSMENT/PLAN:  Back pain  37-year-old female who has had on and off right-sided lumbar sacral back pain since July 2017.  This current episode has been ongoing for the last 2 weeks.  There has been no history of injury except she does a lot of driving for work.  I recommend physical therapy but she was hesitant to pursue this as she does not know if she will have the time for physical therapy.  I will refill Flexeril for her and I provided her with home stretching exercises to do.  She will follow-up with me in 4 weeks if she continues to have no improvement of her symptoms.  The patient verbalized understanding and agreed with the plan  -     cyclobenzaprine (FLEXERIL) 10 MG tablet; Take 1 tablet (10 mg total) by mouth 3 (three) times a day as needed for muscle spasms.  Dispense: 30 tablet; Refill: 1    Palpitation  Very healthy non-smoker who has felt 2 episodes of brief palpitation over the last few months.  I will obtain an echocardiogram to assess ejection fraction and anatomy.  Further management will depend on results of the echocardiogram and the patient's symptoms.  She verbalized understanding and agreed with the plan  -     Echo Complete; Future; Expected date: 2/15/18    Patient Instructions       Back Exercises: Bridge  The bridge exercise strengthens your abdominal, buttock, and hamstring muscles. This helps keep your back stable and aligned when you walk.    Lie on the floor with your back and palms flat. Bend your knees. Keep your feet flat on the floor.    Contract your abdominal and buttock muscles. Slowly lift your buttocks off the floor until there is a straight line from your knees to your shoulders.    Hold  for 5 to 15  seconds. Repeat 5 to10 times.          Back Exercises: Back Release  Do this exercise on your hands and knees. Keep your knees under your hips and your hands under your shoulders.    Relax your abdominal and buttocks muscles, lift your head, and let your back sag. Be sure to keep your weight evenly distributed. Dont sit back on your hips.     Hold for 5 seconds.    Return to starting position.    Tuck your head and lift (arch) your back.    Hold for 5 seconds    Return to starting position.    Repeat 5 times.        Back Exercises: Back Press    Do this exercise on your hands and knees. Keep your knees under your hips and your hands under your shoulders. Keep your spine in a neutral position (not arched or sagging). Be sure to maintain your necks natural curve:    Tighten your stomach and buttock muscles to press your back upward. Let your head drop slightly.    Hold for 5 seconds. Return to starting position.    Repeat 5 times.          Back Exercises: Arm Reach    Do this exercise on your hands and knees. Keep your knees under your hips and your hands under your shoulders. Keep your spine in a neutral position (not arched or sagging). Be sure to maintain your necks natural curve:    Stretch one arm straight out in front of you. Dont raise your head or let your supporting shoulder sag.    Hold for 5 seconds.    Return to starting position.    Repeat 5 times.    Switch arms.    Date Last Reviewed: 8/16/2015              Orders Placed This Encounter   Procedures   ? Echo Complete     Standing Status:   Future     Standing Expiration Date:   2/15/2019           CHIEF COMPLAINT:  Chief Complaint   Patient presents with   ? Back Pain     lower back x long time comes and goes,   ? fast heart beat       HISTORY OF PRESENT ILLNESS:  Brittany is a 37 y.o. female presenting to the clinic today for back pain. She has been having several months of back pain. It has been intermittent for her, but now is happening more  frequently. This episode started 2 weeks ago. She has felt the pain at rest, and the pain radiates down the legs. 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 She is pointing to the right lumbar area of pain. She does have pain to touch in the area. Denies any urinary symptoms. No bowel and bladder dysfunction. The is better if she is able to have prolonged rest. She will take ibuprofen for her pain if she needs it. Her last significant flare of back pain was in July 2017, and she took flexeril with good relief of her pain.     Chest Palpitations: She has noticed some chest palpitations. This happened while she at rest and carrying conversation. The episode lasted a few seconds and felt like she had some fast heart beats. Denies any chest pain or pressure or shortness of breath. She has had 2 episodes in the past 6 months. She is not concerned for anxiety. She has good activity and exercise tolerance. No family history of thyroid dysfunction. She does drink coffee every day, but does not think this is related to caffeine.     REVIEW OF SYSTEMS:   Constitutional: Negative.   HENT: Negative.   Eyes: Negative.   Respiratory: Negative.   Gastrointestinal: Negative.   Endocrine: Negative.   Genitourinary: Negative.   Skin: Negative.   Allergic/Immunologic: Negative.   Neurological: Negative.   Hematological: Negative.   Psychiatric/Behavioral: Negative.   All other systems are negative.    PFSH:  No new history.     TOBACCO USE:  History   Smoking Status   ? Never Smoker   Smokeless Tobacco   ? Never Used       VITALS:  Vitals:    02/15/18 1301   BP: 112/60   Pulse: 60   Weight: 145 lb 1 oz (65.8 kg)     Wt Readings from Last 3 Encounters:   02/15/18 145 lb 1 oz (65.8 kg)   10/05/17 144 lb (65.3 kg)   09/15/17 144 lb (65.3 kg)       PHYSICAL EXAM:  Constitutional: Patient is oriented to person, place, and time. Patient appears well-developed and well-nourished. No  distress.   Head: Normocephalic and atraumatic.   Right Ear: External ear normal.   Left Ear: External ear normal.   Nose: Nose normal.   Mouth/Throat: Oropharynx is clear and moist. No oropharyngeal exudate.   Eyes: Conjunctivae and EOM are normal. Pupils are equal, round, and reactive to light. Right eye exhibits no discharge. Left eye exhibits no discharge. No scleral icterus.   Neck: Neck supple. No JVD present. No tracheal deviation present. No thyromegaly present.   Cardiovascular: Normal rate, regular rhythm, normal heart sounds and intact distal pulses. No murmur heard.   Pulmonary/Chest: Effort normal and breath sounds normal. No stridor. No respiratory distress. Patient has no wheezes, no rales, exhibits no tenderness.   Abdominal: Soft. Bowel sounds are normal. Patient exhibits no distension and no mass. There is no tenderness. There is no rebound and no guarding.   Lymphadenopathy:  Patient has no cervical adenopathy.   Neurological: Patient is alert and oriented to person, place, and time. Patient has normal reflexes. No cranial nerve deficit. Coordination normal.   Skin: Skin is warm and dry. No rash noted. Patient is not diaphoretic. No erythema. No pallor.  Musculoskeletal: Tenderness to palpation to right sided lumbo-sacral area. Tenderness to palpation to the trochanteric bursa bilaterally.    Results for orders placed or performed in visit on 10/05/17   HM2(CBC w/o Differential)   Result Value Ref Range    WBC 7.0 4.0 - 11.0 thou/uL    RBC 4.15 3.80 - 5.40 mill/uL    Hemoglobin 12.4 12.0 - 16.0 g/dL    Hematocrit 36.4 35.0 - 47.0 %    MCV 88 80 - 100 fL    MCH 29.8 27.0 - 34.0 pg    MCHC 34.0 32.0 - 36.0 g/dL    RDW 12.4 11.0 - 14.5 %    Platelets 231 140 - 440 thou/uL    MPV 7.7 7.0 - 10.0 fL   Comprehensive Metabolic Panel   Result Value Ref Range    Sodium 138 136 - 145 mmol/L    Potassium 3.9 3.5 - 5.0 mmol/L    Chloride 105 98 - 107 mmol/L    CO2 22 22 - 31 mmol/L    Anion Gap, Calculation 11  5 - 18 mmol/L    Glucose 86 70 - 125 mg/dL    BUN 4 (L) 8 - 22 mg/dL    Creatinine 0.65 0.60 - 1.10 mg/dL    GFR MDRD Af Amer >60 >60 mL/min/1.73m2    GFR MDRD Non Af Amer >60 >60 mL/min/1.73m2    Bilirubin, Total 0.4 0.0 - 1.0 mg/dL    Calcium 9.1 8.5 - 10.5 mg/dL    Protein, Total 6.7 6.0 - 8.0 g/dL    Albumin 3.8 3.5 - 5.0 g/dL    Alkaline Phosphatase 35 (L) 45 - 120 U/L    AST 22 0 - 40 U/L    ALT 9 0 - 45 U/L   Vitamin D, Total (25-Hydroxy)   Result Value Ref Range    Vitamin D, Total (25-Hydroxy) 29.3 (L) 30.0 - 80.0 ng/mL   Thyroid Stimulating Hormone (TSH)   Result Value Ref Range    TSH 1.52 0.30 - 5.00 uIU/mL   Vitamin B12   Result Value Ref Range    Vitamin B-12 591 213 - 816 pg/mL           ADDITIONAL HISTORY SUMMARIZED (2): None.  DECISION TO OBTAIN EXTRA INFORMATION (1): None.   RADIOLOGY TESTS (1): None.  LABS (1): None.  MEDICINE TESTS (1): Ordered echocardiogram today.  INDEPENDENT REVIEW (2 each): None.     I, Carolann Chiu, am scribing for and in the presence of, Dr. Malave.    ILiberty MD , personally performed the services described in this documentation, as scribed by Carolann Chiu in my presence, and it is both accurate and complete.    MEDICATIONS:  Current Outpatient Prescriptions   Medication Sig Dispense Refill   ? cholecalciferol, vitamin D3, (VITAMIN D3) 4,000 unit cap Take by mouth.     ? VITAMIN B COMPLEX ORAL Take by mouth.     ? cyclobenzaprine (FLEXERIL) 10 MG tablet Take 1 tablet (10 mg total) by mouth 3 (three) times a day as needed for muscle spasms. 30 tablet 1   ? MULTIVITAMIN ORAL Take by mouth.     ? topiramate (TOPAMAX) 25 MG tablet Take 1 tablet (25 mg total) by mouth at bedtime. 30 tablet 0   ? traZODone (DESYREL) 50 MG tablet Take 1 tablet (50 mg total) by mouth at bedtime. 30 tablet 0     No current facility-administered medications for this visit.        Total data points: 1

## 2021-07-06 VITALS
BODY MASS INDEX: 25.24 KG/M2 | WEIGHT: 150.5 LBS | HEART RATE: 68 BPM | DIASTOLIC BLOOD PRESSURE: 78 MMHG | SYSTOLIC BLOOD PRESSURE: 120 MMHG

## 2021-07-15 DIAGNOSIS — F51.01 PRIMARY INSOMNIA: Primary | ICD-10-CM

## 2021-07-20 RX ORDER — PROGESTERONE 100 MG/1
CAPSULE ORAL
Qty: 90 CAPSULE | Refills: 0 | Status: SHIPPED | OUTPATIENT
Start: 2021-07-20 | End: 2022-08-18

## 2021-07-20 NOTE — TELEPHONE ENCOUNTER
"progesterone (PROMETRIUM) 100 MG capsule 30 capsule 0 5/27/2021  --   Sig - Route: Take 1 capsule (100 mg total) by mouth at bedtime. - Oral   Sent to pharmacy as: progesterone micronized 100 mg capsule (PROMETRIUM)   E-Prescribing Status: Receipt confirmed by pharmacy (5/27/2021 11:51 AM CDT)       Last Written Prescription Date:  5/27/21  Last Fill Quantity: 30,  # refills: 0   Last office visit provider:  5/27/21     Requested Prescriptions   Pending Prescriptions Disp Refills     progesterone (PROMETRIUM) 100 MG capsule [Pharmacy Med Name: PROGESTERONE MICRO 100MG CAPSULES] 30 capsule      Sig: TAKE 1 CAPSULE(100 MG) BY MOUTH AT BEDTIME       Hormone Replacement Therapy Failed - 7/15/2021  2:53 PM        Failed - Medication is active on med list        Passed - Blood pressure under 140/90 in past 12 months     BP Readings from Last 3 Encounters:   05/27/21 120/78   05/29/20 116/82   12/18/19 112/76                 Passed - Recent (12 mo) or future (30 days) visit within the authorizing provider's specialty     Patient has had an office visit with the authorizing provider or a provider within the authorizing providers department within the previous 12 mos or has a future within next 30 days. See \"Patient Info\" tab in inbasket, or \"Choose Columns\" in Meds & Orders section of the refill encounter.              Passed - Patient is 18 years of age or older        Passed - No active pregnancy on record        Passed - No positive pregnancy test on record in past 12 months             Brandon Hebert RN 07/20/21 10:34 AM  "

## 2021-10-10 ENCOUNTER — HEALTH MAINTENANCE LETTER (OUTPATIENT)
Age: 41
End: 2021-10-10

## 2021-11-21 ENCOUNTER — IMMUNIZATION (OUTPATIENT)
Dept: FAMILY MEDICINE | Facility: CLINIC | Age: 41
End: 2021-11-21
Payer: COMMERCIAL

## 2021-11-21 PROCEDURE — 90686 IIV4 VACC NO PRSV 0.5 ML IM: CPT

## 2021-11-21 PROCEDURE — 90471 IMMUNIZATION ADMIN: CPT

## 2021-12-07 ENCOUNTER — IMMUNIZATION (OUTPATIENT)
Dept: NURSING | Facility: CLINIC | Age: 41
End: 2021-12-07
Payer: COMMERCIAL

## 2021-12-07 PROCEDURE — 91300 PR COVID VAC PFIZER DIL RECON 30 MCG/0.3 ML IM: CPT

## 2021-12-07 PROCEDURE — 0004A PR COVID VAC PFIZER DIL RECON 30 MCG/0.3 ML IM: CPT

## 2022-07-16 ENCOUNTER — HEALTH MAINTENANCE LETTER (OUTPATIENT)
Age: 42
End: 2022-07-16

## 2022-08-18 ENCOUNTER — OFFICE VISIT (OUTPATIENT)
Dept: FAMILY MEDICINE | Facility: CLINIC | Age: 42
End: 2022-08-18
Payer: COMMERCIAL

## 2022-08-18 VITALS
OXYGEN SATURATION: 99 % | BODY MASS INDEX: 25.05 KG/M2 | SYSTOLIC BLOOD PRESSURE: 118 MMHG | HEART RATE: 96 BPM | WEIGHT: 149.38 LBS | DIASTOLIC BLOOD PRESSURE: 70 MMHG

## 2022-08-18 DIAGNOSIS — Z11.1 SCREENING EXAMINATION FOR PULMONARY TUBERCULOSIS: Primary | ICD-10-CM

## 2022-08-18 DIAGNOSIS — K59.00 CONSTIPATION, UNSPECIFIED CONSTIPATION TYPE: ICD-10-CM

## 2022-08-18 DIAGNOSIS — F40.243 FEAR OF FLYING: ICD-10-CM

## 2022-08-18 PROCEDURE — 99214 OFFICE O/P EST MOD 30 MIN: CPT | Performed by: FAMILY MEDICINE

## 2022-08-18 RX ORDER — ALPRAZOLAM 0.25 MG
TABLET ORAL
Qty: 10 TABLET | Refills: 0 | Status: SHIPPED | OUTPATIENT
Start: 2022-08-18 | End: 2023-09-07

## 2022-08-18 NOTE — PROGRESS NOTES
Assessment & Plan     Screening examination for pulmonary tuberculosis  Letter provided  At this time she is free of communicable diseases including tuberculosis    Fear of flying  Discussed side effects  Follow-up prn  - ALPRAZolam (XANAX) 0.25 MG tablet  Dispense: 10 tablet; Refill: 0    Constipation, unspecified constipation type  Advised fluid, fiber, stool softener. May add miralax prn  - linaclotide (LINZESS) 145 MCG capsule  Dispense: 30 capsule; Refill: 0      Liberty Sapp MD  [facility]    Steven Soto is a 42 year old presenting for the following health issues:  Tb test and med for anxiety flying      History of Present Illness       Reason for visit:  TB test for work, and to talk about up coming trip. Thanks    She eats 2-3 servings of fruits and vegetables daily.She consumes 1 sweetened beverage(s) daily.She exercises with enough effort to increase her heart rate 60 or more minutes per day.  She exercises with enough effort to increase her heart rate 3 or less days per week.   She is taking medications regularly.     She reported a history of BCG vaccine as a child. She also reported a positive mantoux skin test years ago.  A chest xray that was performed on 5/27/2022 specifically for tuberculosis screening did not show any radiographic indication of active pulmonary tuberculosis. She does not recall taking meds for latent TB.  Patient is not exhibiting any active symptoms of tuberculosis and has not had any known exposure.  She needs letter to state that she is free of TB    Will be traveling to [place] with her son for a 5 days vacation. She needs to med to help with anxiety with flying. Has never taken any meds before.    She has been constipated even with more fiber and stool softener. Wondering about linzess as she has heard good things about this med.        Objective    /70   Pulse 96   Wt 67.8 kg (149 lb 6 oz)   SpO2 99%   BMI 25.05  kg/m    Body mass index is 25.05 kg/m .  Physical Exam     Constitutional: Patient is oriented to person, place, and time. Patient appears well-developed and well-nourished. No distress.   Head: Normocephalic and atraumatic.   Right Ear: External ear normal.   Left Ear: External ear normal.   Eyes: Conjunctivae and EOM are normal. Right eye exhibits no discharge. Left eye exhibits no discharge. No scleral icterus.   Neurological: Patient is alert and oriented to person, place, and time.  Skin: No rash noted. Patient is not diaphoretic. No erythema. No pallor.

## 2022-08-18 NOTE — LETTER
8/18/2022        RE: Brittany Pickard  [address]  [place] [address]      Brittany Pickard is a patient of this clinic. I serve as her primary care physician.  She reported a history of BCG vaccine as a child. She also reported a positive mantoux skin test years ago.  A chest xray that was performed on 5/27/2022 specifically for tuberculosis screening did not show any radiographic indication of active pulmonary tuberculosis. Patient is not exhibiting any active symptoms of tuberculosis and has not had any known exposure. At this time she is free of communicable diseases including tuberculosis.      Sincerely,        Liberty Sapp MD

## 2022-08-23 ENCOUNTER — TELEPHONE (OUTPATIENT)
Dept: FAMILY MEDICINE | Facility: CLINIC | Age: 42
End: 2022-08-23

## 2022-08-23 NOTE — TELEPHONE ENCOUNTER
Central Prior Authorization Team   Phone: [phone]    PA Initiation    Medication: Linzess 145MCG capsules  Insurance Company: [employer] - Phone [phone] Fax [phone]  Pharmacy Filling the Rx: [id number] [place], [place] - [address] AT [facility] [address]  Filling Pharmacy Phone: [phone]  Filling Pharmacy Fax:    Start Date: 8/23/2022

## 2022-08-25 NOTE — TELEPHONE ENCOUNTER
Prior Authorization Approval    Authorization Effective Date: 7/24/2022  Authorization Expiration Date: 8/23/2023  Medication: Linzess 145MCG capsules  Approved Dose/Quantity:    Reference #:     Insurance Company: [employer] - Phone [phone] Fax [phone]  Expected CoPay:       CoPay Card Available:      Foundation Assistance Needed:    Which Pharmacy is filling the prescription (Not needed for infusion/clinic administered): [id number] [place], [place] - [facility] [address] AT [facility]  Pharmacy Notified: Yes  Patient Notified: Yes

## 2022-09-18 ENCOUNTER — HEALTH MAINTENANCE LETTER (OUTPATIENT)
Age: 42
End: 2022-09-18

## 2022-09-22 ENCOUNTER — HOSPITAL ENCOUNTER (OUTPATIENT)
Dept: MAMMOGRAPHY | Facility: CLINIC | Age: 42
Discharge: HOME | End: 2022-09-22
Attending: OBSTETRICS & GYNECOLOGY
Payer: COMMERCIAL

## 2022-09-22 DIAGNOSIS — N60.09 BREAST CYST: ICD-10-CM

## 2022-09-22 DIAGNOSIS — N64.4 BREAST PAIN: ICD-10-CM

## 2022-09-22 PROCEDURE — 76642 ULTRASOUND BREAST LIMITED: CPT | Mod: LT

## 2022-09-22 PROCEDURE — 77066 DX MAMMO INCL CAD BI: CPT

## 2022-10-25 ENCOUNTER — MYC MEDICAL ADVICE (OUTPATIENT)
Dept: FAMILY MEDICINE | Facility: CLINIC | Age: 42
End: 2022-10-25

## 2022-10-25 ENCOUNTER — HOSPITAL ENCOUNTER (EMERGENCY)
Facility: CLINIC | Age: 42
Discharge: HOME | End: 2022-10-25
Attending: EMERGENCY MEDICINE | Admitting: EMERGENCY MEDICINE
Payer: COMMERCIAL

## 2022-10-25 ENCOUNTER — APPOINTMENT (OUTPATIENT)
Dept: ULTRASOUND IMAGING | Facility: CLINIC | Age: 42
End: 2022-10-25
Attending: EMERGENCY MEDICINE
Payer: COMMERCIAL

## 2022-10-25 ENCOUNTER — NURSE TRIAGE (OUTPATIENT)
Dept: NURSING | Facility: CLINIC | Age: 42
End: 2022-10-25

## 2022-10-25 VITALS
OXYGEN SATURATION: 100 % | SYSTOLIC BLOOD PRESSURE: 127 MMHG | RESPIRATION RATE: 18 BRPM | TEMPERATURE: 98.1 F | DIASTOLIC BLOOD PRESSURE: 82 MMHG | HEIGHT: 65 IN | WEIGHT: 146 LBS | BODY MASS INDEX: 24.32 KG/M2 | HEART RATE: 74 BPM

## 2022-10-25 DIAGNOSIS — R58 ECCHYMOSIS: ICD-10-CM

## 2022-10-25 PROCEDURE — 93971 EXTREMITY STUDY: CPT | Mod: LT

## 2022-10-25 PROCEDURE — 99284 EMERGENCY DEPT VISIT MOD MDM: CPT | Mod: 25

## 2022-10-25 ASSESSMENT — ACTIVITIES OF DAILY LIVING (ADL): ADLS_ACUITY_SCORE: 33

## 2022-10-25 ASSESSMENT — ENCOUNTER SYMPTOMS: SHORTNESS OF BREATH: 0

## 2022-10-25 NOTE — TELEPHONE ENCOUNTER
"Nurse Triage SBAR    Is this a 2nd Level Triage? YES, LICENSED PRACTITIONER REVIEW IS REQUIRED    Situation: Patient calling with new onset red zulma behind knee.    Background: :She reports that area was painful from 10/20/2022 to 10/22/2022, but not redness at that time.    Assessment: She denies pain at site.  Reports mild swelling at area of redness.  She reports the spot is \"flat, dark red, and the size of 2 quarters\". She denies fever, difficulty breathing, chest pain, warmth, tachycardia, tachypnea, or dizziness.    Protocol Recommended Disposition:   Call PCP    Paged to provider: Dr Morales HUDSON Recommendations:  -Go to ED now    Provider Recommendation Follow Up:   Reached patient/caregiver. Informed of provider's recommendations. Patient verbalized understanding and agrees with the plan.     Carmela Levi RN  10/25/22 7:21 PM  [facility] Nurse Advisor    Reason for Disposition    Patient sounds very sick or weak to the triager    Localized lump (or swelling) without redness or rash    Additional Information    Negative: [1] Sudden onset of rash (within last 2 hours) AND [2] difficulty breathing or swallowing    Negative: Sounds like a life-threatening emergency to the triager    Negative: Athlete's Foot suspected (i.e., itchy rash between the toes)    Negative: Impetigo suspected (i.e., painless infected superficial small sores, less than 1 inch or 2.5 cm, often covered by a soft, yellow-brown scab or crust; sometimes occurring near nasal openings)    Negative: Insect bite(s) suspected    Negative: Jock Itch suspected (i.e., itchy rash on inner thighs near genital area)    Negative: Poison ivy, oak, or sumac contact suspected    Negative: Rash of female genital area (vulva)    Negative: Rash of male genital area (penis or scrotum)    Negative: Redness of immunization site    Negative: Ringworm suspected (i.e., round pink patch, sometimes looks like ring, usually 1/2 to 1 inch [12-25 mm],  in size, " slowly increasing in size)    Negative: Shingles suspected (i.e., painful rash, multiple small blisters grouped together in one area of body; dermatomal distribution)    Negative: Small spot, skin growth, or mole    Negative: Sores or skin ulcer, not a rash    Negative: Wound infection suspected (i.e., pain, spreading redness, or pus; in a cut, puncture, scrape or sutured wound)    Negative: [1] Localized purple or blood-colored spots or dots AND [2] not from injury or friction AND [3] fever    Negative: Small growth, spot, bump, or pigmented area of skin (e.g., moles, skin tags, wart, melanoma, skin cancer)    Negative: Inguinal hernia previously diagnosed by a physician    Negative: Followed a skin injury    Negative: Follows an insect bite    Negative: Swelling of lymph node suspected    Negative: Swelling of vaccination site    Negative: Swelling of tongue    Negative: Swelling of lip    Negative: Swelling of eye    Negative: Swelling of entire face    Negative: Swelling of scrotum    Negative: Swelling of labia    Negative: Swelling of surgical incision    Negative: Swelling of ankle joint    Negative: Swelling of elbow joint    Negative: Swelling of knee joint    Negative: Swelling with a skin rash    Negative: Patient sounds very sick or weak to the triager    Negative: SEVERE pain (e.g., excruciating)    Negative: [1] Swelling is painful to touch AND [2] fever    Protocols used: RASH OR REDNESS - [id number], SKIN LUMP OR LOCALIZED SWELLING-A-AH

## 2022-10-26 NOTE — ED TRIAGE NOTES
Here for bruising and swelling to left posterior knee that started around Thursday, patient sent a picture and was told to come get evaluated to rule out a blood clot   Denies SOB, does have a 2/10 left sided headache      Triage Assessment     Row Name 10/25/22 1942       Triage Assessment (Adult)    Airway WDL WDL       Respiratory WDL    Respiratory WDL WDL       Skin Circulation/Temperature WDL    Skin Circulation/Temperature WDL WDL       Cardiac WDL    Cardiac WDL WDL       Peripheral/Neurovascular WDL    Peripheral Neurovascular WDL WDL       Cognitive/Neuro/Behavioral WDL    Cognitive/Neuro/Behavioral WDL X  left sided head pain       Natalie Coma Scale    Best Eye Response 4-->(E4) spontaneous    Best Motor Response 6-->(M6) obeys commands    Best Verbal Response 5-->(V5) oriented    [place] Coma Scale Score 15

## 2022-10-26 NOTE — DISCHARGE INSTRUCTIONS
There is some localized bruising behind her left knee, but no blood clot.  This could be result of tendon strain, ligament strain, or accidental trauma that burst of the blood vessels.  You should slowly recover over the next week and I would expect a full recovery.  Reach out to your doctor if your symptoms are not improving within the next few days.

## 2022-10-26 NOTE — ED PROVIDER NOTES
EMERGENCY DEPARTMENT ENCOUNTER      NAME: Brittany Pickard  AGE: 42 year old female  YOB: 1980  MRN: [id number]  EVALUATION DATE & TIME: 10/25/2022  8:58 PM    PCP: Liberty Fonseca    ED PROVIDER: Tobi Lemons M.D.      Chief Complaint   Patient presents with     Deep Vein Thrombosis         FINAL IMPRESSION:  1. Ecchymosis          ED COURSE & MEDICAL DECISION MAKING:    Pertinent Labs & Imaging studies reviewed. (See chart for details)  ED Course as of 10/25/22 2227   Tue Oct 25, 2022   2111 Patient is a healthy 42-year-old here with left leg pain, bruising.  It began without trauma slowly 5 days ago, plateaued symptomatically 2 days ago.  Sent here for DVT rule out.  Although hypertensive and tachycardic when she arrived she has completely normal vital signs when I examined her.  She has a focal area behind the left ecchymosis and mild swelling.  No surrounding erythema or proximal or distal tenderness that may be concerning for cellulitis.  Differential includes DVT, nonspecific ecchymosis, tendon strain or injury.   2112 Updated about negative US, discharged with reassurance, primary care follow-up         Additional ED Course Timestamps:  9:00 PM I met with the patient, obtained history, performed an initial exam, and discussed options and plan for diagnostics and treatment here in the ED.  9:13 PM Checked in on and updated patient. We discussed the plan for discharge and the patient is agreeable. Reviewed supportive cares, symptomatic treatment, outpatient follow up, and reasons to return to the Emergency Department. Patient to be discharged by ED RN.         At the conclusion of the encounter I discussed the results of all of the tests and the disposition. The questions were answered. The patient or family acknowledged understanding and was agreeable with the care plan.       MEDICATIONS GIVEN IN THE EMERGENCY:  Medications - No data to display      NEW PRESCRIPTIONS STARTED AT  "TODAY'S ER VISIT  Discharge Medication List as of 10/25/2022  9:21 PM             =================================================================    HPI    Patient information was obtained from: Patient     Use of : N/A         Brittany Pickard is a 42 year old female with no pertinent history after evaluation who presents to this ED for evaluation of deep vein thrombosis.     On Thursday (10/20/2022), patient developed pain at the back of her left knee. Patient states pain was gradual and she was not too alarmed. Over the weekend, pain intensified and on Monday (10/24/2022), patient noted a red zulma in the area. Patient denies any trauma in that area.     At present, pain is not as intense. She states that the area is just tender, swollen, and red. She denies chest pain, shortness of breath, or any other complaints at this time. She takes collagen supplements, vitamin D, multivitamins, and does not take any antibiotics.     Of note, patient states a similar event occurred two years ago that lasted a couple days and went away. She also notes an occasional \"intense heart beat\" but has not been bothered by it and has not seen a cardiologist for it.     REVIEW OF SYSTEMS   Review of Systems   Respiratory: Negative for shortness of breath.    Cardiovascular: Negative for chest pain.   Musculoskeletal:        Positive for tenderness and swelling behind left knee   Skin:        Positive for ecchymosis behind the left knee   All other systems reviewed and are negative.       PAST MEDICAL HISTORY:  History reviewed. No pertinent past medical history.    PAST SURGICAL HISTORY:  Past Surgical History:   Procedure Laterality Date      SECTION  2002     MAMMOPLASTY AUGMENTATION      removed 2020     [facility]  DELIVERY ONLY      Description:  Section;  Recorded: 2011;           CURRENT MEDICATIONS:    No current facility-administered medications for this encounter.     Current Outpatient " "Medications   Medication     ALPRAZolam (XANAX) 0.25 MG tablet     cholecalciferol, vitamin D3, (VITAMIN D3 ORAL)     cyanocobalamin 1000 MCG tablet     linaclotide (LINZESS) 145 MCG capsule     multivitamin with minerals (THERA-M) 9 mg iron-400 mcg Tab tablet       ALLERGIES:  Allergies   Allergen Reactions     Melatonin Hives     Other Allergy (See Comments) [External Allergen Needs Reconciliation - See Comment] Unknown     Melatonin TABS, 09/01/2013.         FAMILY HISTORY:  Family History   Problem Relation Age of Onset     Cancer Father      Hypertension Mother        SOCIAL HISTORY:   Social History     Socioeconomic History     Marital status: Single   Tobacco Use     Smoking status: Never     Smokeless tobacco: Never   Substance and Sexual Activity     Alcohol use: Never     Drug use: Never     Sexual activity: Not Currently     Partners: Male     Birth control/protection: None   Social History Narrative    ** Merged History Encounter **         VITALS:  /82   Pulse 74   Temp 98.1  F (36.7  C) (Temporal)   Resp 18   Ht 1.651 m (5' 5\")   Wt 66.2 kg (146 lb)   LMP 10/25/2022   SpO2 100%   BMI 24.30 kg/m      PHYSICAL EXAM    Constitutional: Well developed, well nourished. Comfortable appearing.  HENT: Normocephalic, atraumatic, mucous membranes moist, nose normal. Neck- Supple, gross ROM intact.   Eyes: Pupils mid-range, conjunctiva without injection, no discharge.   Respiratory: Clear to auscultation bilaterally, no respiratory distress, no wheezing, speaks full sentences easily. No cough.  Cardiovascular: Normal heart rate, regular rhythm, no murmurs.   Musculoskeletal: ecchymosis behind left knee with mild tenderness. No proximal or distal tenderness. No obvious deformity.  Skin: Warm, dry, no rash.  Neurologic: Alert & oriented x 3, cranial nerves grossly intact.  Psychiatric: Affect normal, cooperative.       LAB:  All pertinent labs reviewed and interpreted.  Labs Ordered and Resulted from " Time of ED Arrival to Time of ED Departure - No data to display    RADIOLOGY:  Reviewed all pertinent imaging. Please see official radiology report.  US Lower Extremity Venous Duplex Left   Final Result   IMPRESSION:   1.  No deep venous thrombosis in the left lower extremity.          EKG:    All EKG interpretations will be found in ED course above.    PROCEDURES:   None      I, Carolann Dumas am serving as a scribe to document services personally performed by Dr. Tobi Lemons based on my observation and the provider's statements to me. I, Tobi Lemons MD attest that Carolann Dumas is acting in a scribe capacity, has observed my performance of the services and has documented them in accordance with my direction.    Tobi Lemons M.D.  Emergency Medicine  [facility] EMERGENCY ROOM  [address] [address]  [phone]  Dept: [phone]     Tobi Lemons MD  10/25/22 0172

## 2022-12-01 ENCOUNTER — IMMUNIZATION (OUTPATIENT)
Dept: FAMILY MEDICINE | Facility: CLINIC | Age: 42
End: 2022-12-01
Payer: COMMERCIAL

## 2022-12-01 PROCEDURE — 90686 IIV4 VACC NO PRSV 0.5 ML IM: CPT

## 2022-12-01 PROCEDURE — 91312 COVID-19 VACCINE BIVALENT BOOSTER 12+ (PFIZER): CPT

## 2022-12-01 PROCEDURE — 90471 IMMUNIZATION ADMIN: CPT

## 2022-12-01 PROCEDURE — 0124A COVID-19 VACCINE BIVALENT BOOSTER 12+ (PFIZER): CPT

## 2023-03-08 ENCOUNTER — HOSPITAL ENCOUNTER (OUTPATIENT)
Dept: GENERAL RADIOLOGY | Facility: HOSPITAL | Age: 43
Discharge: HOME | End: 2023-03-08
Attending: INTERNAL MEDICINE
Payer: COMMERCIAL

## 2023-03-08 DIAGNOSIS — R76.11 POSITIVE PPD: ICD-10-CM

## 2023-03-08 PROCEDURE — 999N000028 XR CHEST 1 VIEW, EMPLOYEE HEALTH

## 2023-03-27 ENCOUNTER — E-VISIT (OUTPATIENT)
Dept: URGENT CARE | Facility: CLINIC | Age: 43
End: 2023-03-27
Payer: COMMERCIAL

## 2023-03-27 DIAGNOSIS — H92.09 EARACHE: Primary | ICD-10-CM

## 2023-03-27 PROCEDURE — 99207 PR NON-BILLABLE SERV PER CHARTING: CPT | Performed by: NURSE PRACTITIONER

## 2023-03-27 NOTE — PATIENT INSTRUCTIONS
Dear Brittany Pickard,    We are sorry you are not feeling well. Based on the responses you provided, it is recommended that you be seen in-person in urgent care so we can better evaluate your symptoms. Please click here to find the nearest urgent care location to you.   You will not be charged for this Visit. Thank you for trusting us with your care.    TIGRE Carolina CNP

## 2023-05-18 ENCOUNTER — PATIENT OUTREACH (OUTPATIENT)
Dept: CARE COORDINATION | Facility: CLINIC | Age: 43
End: 2023-05-18
Payer: COMMERCIAL

## 2023-06-02 ENCOUNTER — PATIENT OUTREACH (OUTPATIENT)
Dept: CARE COORDINATION | Facility: CLINIC | Age: 43
End: 2023-06-02
Payer: COMMERCIAL

## 2023-07-30 ENCOUNTER — HEALTH MAINTENANCE LETTER (OUTPATIENT)
Age: 43
End: 2023-07-30

## 2023-09-07 ENCOUNTER — OFFICE VISIT (OUTPATIENT)
Dept: FAMILY MEDICINE | Facility: CLINIC | Age: 43
End: 2023-09-07
Payer: COMMERCIAL

## 2023-09-07 VITALS
HEIGHT: 65 IN | OXYGEN SATURATION: 99 % | HEART RATE: 77 BPM | DIASTOLIC BLOOD PRESSURE: 78 MMHG | TEMPERATURE: 97.1 F | WEIGHT: 150 LBS | BODY MASS INDEX: 24.99 KG/M2 | SYSTOLIC BLOOD PRESSURE: 118 MMHG

## 2023-09-07 DIAGNOSIS — G89.29 CHRONIC BILATERAL LOW BACK PAIN WITHOUT SCIATICA: ICD-10-CM

## 2023-09-07 DIAGNOSIS — E55.9 VITAMIN D DEFICIENCY: Primary | ICD-10-CM

## 2023-09-07 DIAGNOSIS — D64.9 NORMOCYTIC ANEMIA: ICD-10-CM

## 2023-09-07 DIAGNOSIS — M54.50 CHRONIC BILATERAL LOW BACK PAIN WITHOUT SCIATICA: ICD-10-CM

## 2023-09-07 LAB
ALBUMIN SERPL BCG-MCNC: 4.3 G/DL (ref 3.5–5.2)
ALP SERPL-CCNC: 47 U/L (ref 35–104)
ALT SERPL W P-5'-P-CCNC: 8 U/L (ref 0–50)
ANION GAP SERPL CALCULATED.3IONS-SCNC: 10 MMOL/L (ref 7–15)
AST SERPL W P-5'-P-CCNC: 24 U/L (ref 0–45)
BILIRUB SERPL-MCNC: <0.2 MG/DL
BUN SERPL-MCNC: 6.6 MG/DL (ref 6–20)
CALCIUM SERPL-MCNC: 9.1 MG/DL (ref 8.6–10)
CHLORIDE SERPL-SCNC: 103 MMOL/L (ref 98–107)
CREAT SERPL-MCNC: 0.58 MG/DL (ref 0.51–0.95)
DEPRECATED HCO3 PLAS-SCNC: 23 MMOL/L (ref 22–29)
EGFRCR SERPLBLD CKD-EPI 2021: >90 ML/MIN/1.73M2
ERYTHROCYTE [DISTWIDTH] IN BLOOD BY AUTOMATED COUNT: 16.9 % (ref 10–15)
GLUCOSE SERPL-MCNC: 95 MG/DL (ref 70–99)
HCT VFR BLD AUTO: 32.1 % (ref 35–47)
HGB BLD-MCNC: 10.4 G/DL (ref 11.7–15.7)
MCH RBC QN AUTO: 26.8 PG (ref 26.5–33)
MCHC RBC AUTO-ENTMCNC: 32.4 G/DL (ref 31.5–36.5)
MCV RBC AUTO: 83 FL (ref 78–100)
PLATELET # BLD AUTO: 331 10E3/UL (ref 150–450)
POTASSIUM SERPL-SCNC: 4 MMOL/L (ref 3.4–5.3)
PROT SERPL-MCNC: 7.1 G/DL (ref 6.4–8.3)
RBC # BLD AUTO: 3.88 10E6/UL (ref 3.8–5.2)
SODIUM SERPL-SCNC: 136 MMOL/L (ref 136–145)
WBC # BLD AUTO: 10.7 10E3/UL (ref 4–11)

## 2023-09-07 PROCEDURE — 80053 COMPREHEN METABOLIC PANEL: CPT | Performed by: PHYSICIAN ASSISTANT

## 2023-09-07 PROCEDURE — 90472 IMMUNIZATION ADMIN EACH ADD: CPT | Performed by: PHYSICIAN ASSISTANT

## 2023-09-07 PROCEDURE — 99214 OFFICE O/P EST MOD 30 MIN: CPT | Mod: 25 | Performed by: PHYSICIAN ASSISTANT

## 2023-09-07 PROCEDURE — 85027 COMPLETE CBC AUTOMATED: CPT | Performed by: PHYSICIAN ASSISTANT

## 2023-09-07 PROCEDURE — 36415 COLL VENOUS BLD VENIPUNCTURE: CPT | Performed by: PHYSICIAN ASSISTANT

## 2023-09-07 PROCEDURE — 82728 ASSAY OF FERRITIN: CPT | Performed by: PHYSICIAN ASSISTANT

## 2023-09-07 PROCEDURE — 83550 IRON BINDING TEST: CPT | Performed by: PHYSICIAN ASSISTANT

## 2023-09-07 PROCEDURE — 90686 IIV4 VACC NO PRSV 0.5 ML IM: CPT | Performed by: PHYSICIAN ASSISTANT

## 2023-09-07 PROCEDURE — 83540 ASSAY OF IRON: CPT | Performed by: PHYSICIAN ASSISTANT

## 2023-09-07 PROCEDURE — 90715 TDAP VACCINE 7 YRS/> IM: CPT | Performed by: PHYSICIAN ASSISTANT

## 2023-09-07 PROCEDURE — 82306 VITAMIN D 25 HYDROXY: CPT | Performed by: PHYSICIAN ASSISTANT

## 2023-09-07 PROCEDURE — 90471 IMMUNIZATION ADMIN: CPT | Performed by: PHYSICIAN ASSISTANT

## 2023-09-07 ASSESSMENT — ENCOUNTER SYMPTOMS: BACK PAIN: 1

## 2023-09-07 NOTE — PROGRESS NOTES
Assessment & Plan     Vitamin D Deficiency  - Vitamin D Deficiency  - Vitamin D Deficiency    Chronic bilateral low back pain without sciatica  Begin physical therapy, schedule mri  Follow up in clinic 2 months to recheck, sooner with any new or worsening symptoms    - Comprehensive metabolic panel (BMP + Alb, Alk Phos, ALT, AST, Total. Bili, TP)  - CBC with platelets  - MR Lumbar Spine w/o Contrast  - Physical Therapy Referral  - Comprehensive metabolic panel (BMP + Alb, Alk Phos, ALT, AST, Total. Bili, TP)  - CBC with platelets    Normocytic anemia  - Ferritin  - Iron and iron binding capacity  - Ferritin  - Iron and iron binding capacity      ZOEY Morales [facility] AZAR Soto is a 43 year old, presenting for the following health issues:  Establish Care, Insomnia, and Back Pain      9/7/2023    12:29 PM   Additional Questions   Roomed by Lou ORTIZ       Back Pain     History of Present Illness       Back Pain:  She presents for follow up of back pain. Patient's back pain is a chronic problem.  Location of back pain:  Right lower back  Description of back pain: dull ache and sharp  Back pain spreads: left buttocks    Since patient first noticed back pain, pain is: rapidly worsening  Does back pain interfere with her job:  Yes       Reason for visit:  Back pain, insomnia  Symptom onset:  More than a month  Symptoms include:  Lower back pain  Symptom intensity:  Severe  Symptom progression:  Staying the same  Had these symptoms before:  Yes  Has tried/received treatment for these symptoms:  No  What makes it worse:  Standing long time  What makes it better:  Hot shower, ibuprofen    She eats 2-3 servings of fruits and vegetables daily.She consumes 1 sweetened beverage(s) daily.She exercises with enough effort to increase her heart rate 9 or less minutes per day.  She exercises with enough effort to increase her heart rate 3 or less days per week.   She is  "taking medications regularly.       Back pain x 2-3 years, located low mid back does not radiate.  No known injury.  Has taken ibuprofen for the pain and uses lidocaine cream, showers seem to help.  She has seen chiropractic, has not seen physical therapy.  Has never had an mri.  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      Anemia- has remote h/o anemia, no issues in the past few years, notes she is trying to get pregnant.      Review of Systems   Musculoskeletal:  Positive for back pain.      Constitutional, HEENT, cardiovascular, pulmonary, gi and gu systems are negative, except as otherwise noted.      Objective    Ht 1.651 m (5' 5\")   BMI 24.30 kg/m    Body mass index is 24.3 kg/m .  Physical Exam   GENERAL: healthy, alert and no distress  NECK: no adenopathy, no asymmetry, masses, or scars and thyroid normal to palpation  RESP: lungs clear to auscultation - no rales, rhonchi or wheezes  CV: regular rate and rhythm, normal S1 S2, no S3 or S4, no murmur, click or rub, no peripheral edema and peripheral pulses strong  ABDOMEN: soft, nontender, no hepatosplenomegaly, no masses and bowel sounds normal  MS: no gross musculoskeletal defects noted, no edema, negative slr                      "

## 2023-09-08 LAB — DEPRECATED CALCIDIOL+CALCIFEROL SERPL-MC: 25 UG/L (ref 20–75)

## 2023-09-12 ENCOUNTER — PATIENT OUTREACH (OUTPATIENT)
Dept: ONCOLOGY | Facility: CLINIC | Age: 43
End: 2023-09-12
Payer: COMMERCIAL

## 2023-09-12 ENCOUNTER — TELEPHONE (OUTPATIENT)
Dept: FAMILY MEDICINE | Facility: CLINIC | Age: 43
End: 2023-09-12
Payer: COMMERCIAL

## 2023-09-12 DIAGNOSIS — D64.9 ANEMIA, UNSPECIFIED TYPE: Primary | ICD-10-CM

## 2023-09-12 LAB
FERRITIN SERPL-MCNC: 6 NG/ML (ref 6–175)
IRON BINDING CAPACITY (ROCHE): 382 UG/DL (ref 240–430)
IRON SATN MFR SERPL: 4 % (ref 15–46)
IRON SERPL-MCNC: 16 UG/DL (ref 37–145)

## 2023-09-12 RX ORDER — FERROUS SULFATE 325(65) MG
325 TABLET ORAL
Qty: 90 TABLET | Refills: 1 | Status: SHIPPED | OUTPATIENT
Start: 2023-09-12 | End: 2024-03-04

## 2023-09-12 NOTE — TELEPHONE ENCOUNTER
Spoke to pt about her anemia, she is open to taking iron, will send to pharmacy. She is concerned about the new anemia and trying to get pregnant, will refer to hematology as well.    Karly Lee PA-C

## 2023-09-12 NOTE — PROGRESS NOTES
Hematology referral reviewed for [facility], see below.    Referral reason: ANGELES, Hgb 10.4, ferritin 6, iron 16, and iron sat 4 dated 9/7/23, starting on oral iron per referring provider notes in chart    Current abnormal labs: Available in Chart Review    Outreach: Call not placed to patient regarding referral.    Plan: Triage instructions updated and sent to [employer]PS for completion.

## 2023-09-12 NOTE — TELEPHONE ENCOUNTER
09/12/23  Test Results        Who ordered the test:  Karly Lee    Type of test: Lab    Date of test:  09/07/23    Where was the test performed:  [facility]    What are your questions/concerns?:  Pt states she saw Karly's message that she was anemic but she wants more clarification on this and what she can do currently for it. Pt is requesting a call from Karly Lee to discuss    Could we send this information to you in [facility] or would you prefer to receive a phone call?:   Patient would prefer a phone call   Okay to leave a detailed message?: Yes at Home number on file [phone] (home)

## 2023-09-20 NOTE — TELEPHONE ENCOUNTER
RECORDS STATUS - ALL OTHER DIAGNOSIS      RECORDS RECEIVED FROM: Epic   DATE RECEIVED:    NOTES STATUS DETAILS   OFFICE NOTE from referring provider Epic 9/7/23: Karly Lee PA-C   MEDICATION LIST Epic    LABS     ANYTHING RELATED TO DIAGNOSIS Epic

## 2023-09-27 ENCOUNTER — ONCOLOGY VISIT (OUTPATIENT)
Dept: ONCOLOGY | Facility: CLINIC | Age: 43
End: 2023-09-27
Attending: PHYSICIAN ASSISTANT
Payer: COMMERCIAL

## 2023-09-27 ENCOUNTER — PRE VISIT (OUTPATIENT)
Dept: ONCOLOGY | Facility: CLINIC | Age: 43
End: 2023-09-27
Payer: COMMERCIAL

## 2023-09-27 VITALS
BODY MASS INDEX: 24.94 KG/M2 | SYSTOLIC BLOOD PRESSURE: 137 MMHG | WEIGHT: 149.7 LBS | OXYGEN SATURATION: 99 % | HEART RATE: 82 BPM | RESPIRATION RATE: 16 BRPM | HEIGHT: 65 IN | DIASTOLIC BLOOD PRESSURE: 99 MMHG

## 2023-09-27 DIAGNOSIS — D50.0 IRON DEFICIENCY ANEMIA DUE TO CHRONIC BLOOD LOSS: Primary | ICD-10-CM

## 2023-09-27 DIAGNOSIS — D64.9 ANEMIA, UNSPECIFIED TYPE: ICD-10-CM

## 2023-09-27 LAB
BASOPHILS # BLD AUTO: 0.1 10E3/UL (ref 0–0.2)
BASOPHILS NFR BLD AUTO: 1 %
EOSINOPHIL # BLD AUTO: 0.1 10E3/UL (ref 0–0.7)
EOSINOPHIL NFR BLD AUTO: 2 %
ERYTHROCYTE [DISTWIDTH] IN BLOOD BY AUTOMATED COUNT: 18.9 % (ref 10–15)
HCT VFR BLD AUTO: 36.4 % (ref 35–47)
HGB BLD-MCNC: 11.5 G/DL (ref 11.7–15.7)
IMM GRANULOCYTES # BLD: 0 10E3/UL
IMM GRANULOCYTES NFR BLD: 0 %
LYMPHOCYTES # BLD AUTO: 2 10E3/UL (ref 0.8–5.3)
LYMPHOCYTES NFR BLD AUTO: 29 %
MCH RBC QN AUTO: 26.6 PG (ref 26.5–33)
MCHC RBC AUTO-ENTMCNC: 31.6 G/DL (ref 31.5–36.5)
MCV RBC AUTO: 84 FL (ref 78–100)
MONOCYTES # BLD AUTO: 0.4 10E3/UL (ref 0–1.3)
MONOCYTES NFR BLD AUTO: 5 %
NEUTROPHILS # BLD AUTO: 4.5 10E3/UL (ref 1.6–8.3)
NEUTROPHILS NFR BLD AUTO: 63 %
NRBC # BLD AUTO: 0 10E3/UL
NRBC BLD AUTO-RTO: 0 /100
PLATELET # BLD AUTO: 315 10E3/UL (ref 150–450)
RBC # BLD AUTO: 4.32 10E6/UL (ref 3.8–5.2)
RETICS # AUTO: 0.1 10E6/UL (ref 0.01–0.11)
RETICS/RBC NFR AUTO: 2.3 % (ref 0.8–2.7)
TSH SERPL DL<=0.005 MIU/L-ACNC: 2.71 UIU/ML (ref 0.3–4.2)
WBC # BLD AUTO: 7.1 10E3/UL (ref 4–11)

## 2023-09-27 PROCEDURE — 84443 ASSAY THYROID STIM HORMONE: CPT | Performed by: INTERNAL MEDICINE

## 2023-09-27 PROCEDURE — 85025 COMPLETE CBC W/AUTO DIFF WBC: CPT | Performed by: INTERNAL MEDICINE

## 2023-09-27 PROCEDURE — G0463 HOSPITAL OUTPT CLINIC VISIT: HCPCS | Performed by: INTERNAL MEDICINE

## 2023-09-27 PROCEDURE — 99204 OFFICE O/P NEW MOD 45 MIN: CPT | Performed by: INTERNAL MEDICINE

## 2023-09-27 PROCEDURE — 85045 AUTOMATED RETICULOCYTE COUNT: CPT | Performed by: INTERNAL MEDICINE

## 2023-09-27 PROCEDURE — 36415 COLL VENOUS BLD VENIPUNCTURE: CPT | Performed by: INTERNAL MEDICINE

## 2023-09-27 PROCEDURE — 85060 BLOOD SMEAR INTERPRETATION: CPT | Performed by: PATHOLOGY

## 2023-09-27 RX ORDER — DIPHENHYDRAMINE HYDROCHLORIDE 50 MG/ML
50 INJECTION INTRAMUSCULAR; INTRAVENOUS
Status: CANCELLED
Start: 2023-09-27

## 2023-09-27 RX ORDER — ALBUTEROL SULFATE 90 UG/1
1-2 AEROSOL, METERED RESPIRATORY (INHALATION)
Status: CANCELLED
Start: 2023-09-27

## 2023-09-27 RX ORDER — EPINEPHRINE 1 MG/ML
0.3 INJECTION, SOLUTION INTRAMUSCULAR; SUBCUTANEOUS EVERY 5 MIN PRN
Status: CANCELLED | OUTPATIENT
Start: 2023-09-27

## 2023-09-27 RX ORDER — HEPARIN SODIUM,PORCINE 10 UNIT/ML
5-20 VIAL (ML) INTRAVENOUS DAILY PRN
Status: CANCELLED | OUTPATIENT
Start: 2023-09-27

## 2023-09-27 RX ORDER — ALBUTEROL SULFATE 0.83 MG/ML
2.5 SOLUTION RESPIRATORY (INHALATION)
Status: CANCELLED | OUTPATIENT
Start: 2023-09-27

## 2023-09-27 RX ORDER — METHYLPREDNISOLONE SODIUM SUCCINATE 125 MG/2ML
125 INJECTION, POWDER, LYOPHILIZED, FOR SOLUTION INTRAMUSCULAR; INTRAVENOUS
Status: CANCELLED
Start: 2023-09-27

## 2023-09-27 RX ORDER — HEPARIN SODIUM (PORCINE) LOCK FLUSH IV SOLN 100 UNIT/ML 100 UNIT/ML
5 SOLUTION INTRAVENOUS
Status: CANCELLED | OUTPATIENT
Start: 2023-09-27

## 2023-09-27 RX ORDER — MEPERIDINE HYDROCHLORIDE 50 MG/ML
25 INJECTION INTRAMUSCULAR; INTRAVENOUS; SUBCUTANEOUS EVERY 30 MIN PRN
Status: CANCELLED | OUTPATIENT
Start: 2023-09-27

## 2023-09-27 ASSESSMENT — ENCOUNTER SYMPTOMS
CONSTIPATION: 1
MYALGIAS: 1
SLEEP DISTURBANCE: 1
RESPIRATORY NEGATIVE: 1
EYES NEGATIVE: 1
PALPITATIONS: 1
HEMATOLOGIC/LYMPHATIC NEGATIVE: 1
NEUROLOGICAL NEGATIVE: 1
FATIGUE: 1
ENDOCRINE COMMENTS: HEAVY MENSES

## 2023-09-27 ASSESSMENT — PAIN SCALES - GENERAL: PAINLEVEL: NO PAIN (0)

## 2023-09-27 NOTE — PROGRESS NOTES
"Oncology Rooming Note    September 27, 2023 9:29 AM   Brittany Pickard is a 43 year old female who presents for:    Chief Complaint   Patient presents with    Hematology     New consult Anemia      Initial Vitals: BP (!) 137/99 (BP Location: Left arm, Patient Position: Left side, Cuff Size: Adult Regular)   Pulse 82   Resp 16   Ht 1.651 m (5' 5\")   Wt 67.9 kg (149 lb 11.2 oz)   LMP 08/28/2023 (Exact Date)   SpO2 99%   BMI 24.91 kg/m   Estimated body mass index is 24.91 kg/m  as calculated from the following:    Height as of this encounter: 1.651 m (5' 5\").    Weight as of this encounter: 67.9 kg (149 lb 11.2 oz). Body surface area is 1.76 meters squared.  No Pain (0) Comment: Data Unavailable   Patient's last menstrual period was 08/28/2023 (exact date).  Allergies reviewed: Yes  Medications reviewed: Yes    Medications: Medication refills not needed today.  Pharmacy name entered into [employer]: [id number] - [place], [place] - [facility] [address] AT [facility]    Clinical concerns:  New consult anemia      Sona Rodriguez LPN              "

## 2023-09-27 NOTE — PROGRESS NOTES
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  She has documented low iron (ferritin 6) and started oral iron but has had constipation and persistent symptoms.  She does eat some meat.  Her menses are regular with several heavy days each month.    ECOG 0-1    Patient Active Problem List   Diagnosis    Allergic Rhinitis    Insomnia    Vitamin D Deficiency    Chronic fatigue    Chronic bilateral low back pain without sciatica    Slow transit constipation     Current Outpatient Medications   Medication    cholecalciferol, vitamin D3, (VITAMIN D3 ORAL)    cyanocobalamin 1000 MCG tablet    ferrous sulfate (FEROSUL) 325 (65 Fe) MG tablet    multivitamin with minerals (THERA-M) 9 mg iron-400 mcg Tab tablet     No current facility-administered medications for this visit.     No past medical history on file.  Past Surgical History:   Procedure Laterality Date     SECTION  2002    MAMMOPLASTY AUGMENTATION      removed 2020    [facility]  DELIVERY ONLY      Description:  Section;  Recorded: 2011;     Socioeconomic History    Marital status: Single     Review of Systems   Constitutional:  Positive for fatigue.   HENT:  Negative.     Eyes: Negative.    Respiratory: Negative.     Cardiovascular:  Positive for palpitations.   Gastrointestinal:  Positive for constipation.   Endocrine:        Heavy menses   Genitourinary:  Positive for vaginal bleeding.    Musculoskeletal:  Positive for myalgias.   Skin: Negative.    Neurological: Negative.    Hematological: Negative.    Psychiatric/Behavioral:  Positive for sleep disturbance (sleep maintenance problems).    All other systems reviewed and are  "negative.    BP (!) 137/99 (BP Location: Left arm, Patient Position: Left side, Cuff Size: Adult Regular)   Pulse 82   Resp 16   Ht 1.651 m (5' 5\")   Wt 67.9 kg (149 lb 11.2 oz)   LMP 08/28/2023 (Exact Date)   SpO2 99%   BMI 24.91 kg/m    Physical Exam  Vitals and nursing note reviewed.   Constitutional:       Appearance: Normal appearance. She is normal weight.   HENT:      Head: Normocephalic and atraumatic.      Mouth/Throat:      Mouth: Mucous membranes are moist.      Comments: Native dentition in good repair  Eyes:      Extraocular Movements: Extraocular movements intact.      Pupils: Pupils are equal, round, and reactive to light.   Neck:      Thyroid: Thyromegaly (mild) present.   Cardiovascular:      Rate and Rhythm: Normal rate and regular rhythm.      Heart sounds: Normal heart sounds.   Pulmonary:      Effort: Pulmonary effort is normal.      Breath sounds: Normal breath sounds.   Abdominal:      General: There is no distension.      Palpations: Abdomen is soft. There is no mass.      Tenderness: There is no abdominal tenderness. There is no guarding.   Musculoskeletal:         General: No deformity.      Right lower leg: No edema.      Left lower leg: No edema.   Lymphadenopathy:      Cervical: No cervical adenopathy.   Skin:     General: Skin is warm.   Neurological:      General: No focal deficit present.      Mental Status: She is alert and oriented to person, place, and time.      Cranial Nerves: No cranial nerve deficit.      Motor: No weakness.      Gait: Gait normal.   Psychiatric:         Mood and Affect: Mood normal.         Thought Content: Thought content normal.         Judgment: Judgment normal.       Recent Results (from the past 720 hour(s))   Vitamin D Deficiency    Collection Time: 09/07/23  1:22 PM   Result Value Ref Range    Vitamin D, Total (25-Hydroxy) 25 20 - 75 ug/L   Comprehensive metabolic panel (BMP + Alb, Alk Phos, ALT, AST, Total. Bili, TP)    Collection Time: 09/07/23 "  1:22 PM   Result Value Ref Range    Sodium 136 136 - 145 mmol/L    Potassium 4.0 3.4 - 5.3 mmol/L    Chloride 103 98 - 107 mmol/L    Carbon Dioxide (CO2) 23 22 - 29 mmol/L    Anion Gap 10 7 - 15 mmol/L    Urea Nitrogen 6.6 6.0 - 20.0 mg/dL    Creatinine 0.58 0.51 - 0.95 mg/dL    Calcium 9.1 8.6 - 10.0 mg/dL    Glucose 95 70 - 99 mg/dL    Alkaline Phosphatase 47 35 - 104 U/L    AST 24 0 - 45 U/L    ALT 8 0 - 50 U/L    Protein Total 7.1 6.4 - 8.3 g/dL    Albumin 4.3 3.5 - 5.2 g/dL    Bilirubin Total <0.2 <=1.2 mg/dL    GFR Estimate >90 >60 mL/min/1.73m2   CBC with platelets    Collection Time: 09/07/23  1:22 PM   Result Value Ref Range    WBC Count 10.7 4.0 - 11.0 10e3/uL    RBC Count 3.88 3.80 - 5.20 10e6/uL    Hemoglobin 10.4 (L) 11.7 - 15.7 g/dL    Hematocrit 32.1 (L) 35.0 - 47.0 %    MCV 83 78 - 100 fL    MCH 26.8 26.5 - 33.0 pg    MCHC 32.4 31.5 - 36.5 g/dL    RDW 16.9 (H) 10.0 - 15.0 %    Platelet Count 331 150 - 450 10e3/uL   Ferritin    Collection Time: 09/07/23  1:22 PM   Result Value Ref Range    Ferritin 6 6 - 175 ng/mL   Iron and iron binding capacity    Collection Time: 09/07/23  1:22 PM   Result Value Ref Range    Iron 16 (L) 37 - 145 ug/dL    Iron Binding Capacity 382 240 - 430 ug/dL    Iron Sat Index 4 (L) 15 - 46 %     No results found for this or any previous visit (from the past 744 hour(s)).

## 2023-09-27 NOTE — LETTER
2023         RE: Brittany Pickard  [address]  [facility] [address]        Dear Colleague,    Thank you for referring your patient, Brittany Pickard, to the [facility]. Please see a copy of my visit note below.    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  She has documented low iron (ferritin 6) and started oral iron but has had constipation and persistent symptoms.  She does eat some meat.  Her menses are regular with several heavy days each month.    ECOG 0-1    Patient Active Problem List   Diagnosis     Allergic Rhinitis     Insomnia     Vitamin D Deficiency     Chronic fatigue     Chronic bilateral low back pain without sciatica     Slow transit constipation     Current Outpatient Medications   Medication     cholecalciferol, vitamin D3, (VITAMIN D3 ORAL)     cyanocobalamin 1000 MCG tablet     ferrous sulfate (FEROSUL) 325 (65 Fe) MG tablet     multivitamin with minerals (THERA-M) 9 mg iron-400 mcg Tab tablet     No current facility-administered medications for this visit.     No past medical history on file.  Past Surgical History:   Procedure Laterality Date      SECTION  2002     MAMMOPLASTY AUGMENTATION      removed 2020     [facility]  DELIVERY ONLY      Description:  Section;  Recorded: 2011;     Socioeconomic History     Marital status: Single     Review of Systems   Constitutional:  Positive for fatigue.   HENT:  Negative.     Eyes: Negative.    Respiratory: Negative.     Cardiovascular:  Positive for palpitations.   Gastrointestinal:  Positive for constipation.   Endocrine:        Heavy menses   Genitourinary:   "Positive for vaginal bleeding.    Musculoskeletal:  Positive for myalgias.   Skin: Negative.    Neurological: Negative.    Hematological: Negative.    Psychiatric/Behavioral:  Positive for sleep disturbance (sleep maintenance problems).    All other systems reviewed and are negative.    BP (!) 137/99 (BP Location: Left arm, Patient Position: Left side, Cuff Size: Adult Regular)   Pulse 82   Resp 16   Ht 1.651 m (5' 5\")   Wt 67.9 kg (149 lb 11.2 oz)   LMP 08/28/2023 (Exact Date)   SpO2 99%   BMI 24.91 kg/m    Physical Exam  Vitals and nursing note reviewed.   Constitutional:       Appearance: Normal appearance. She is normal weight.   HENT:      Head: Normocephalic and atraumatic.      Mouth/Throat:      Mouth: Mucous membranes are moist.      Comments: Native dentition in good repair  Eyes:      Extraocular Movements: Extraocular movements intact.      Pupils: Pupils are equal, round, and reactive to light.   Neck:      Thyroid: Thyromegaly (mild) present.   Cardiovascular:      Rate and Rhythm: Normal rate and regular rhythm.      Heart sounds: Normal heart sounds.   Pulmonary:      Effort: Pulmonary effort is normal.      Breath sounds: Normal breath sounds.   Abdominal:      General: There is no distension.      Palpations: Abdomen is soft. There is no mass.      Tenderness: There is no abdominal tenderness. There is no guarding.   Musculoskeletal:         General: No deformity.      Right lower leg: No edema.      Left lower leg: No edema.   Lymphadenopathy:      Cervical: No cervical adenopathy.   Skin:     General: Skin is warm.   Neurological:      General: No focal deficit present.      Mental Status: She is alert and oriented to person, place, and time.      Cranial Nerves: No cranial nerve deficit.      Motor: No weakness.      Gait: Gait normal.   Psychiatric:         Mood and Affect: Mood normal.         Thought Content: Thought content normal.         Judgment: Judgment normal.       Recent " Results (from the past 720 hour(s))   Vitamin D Deficiency    Collection Time: 09/07/23  1:22 PM   Result Value Ref Range    Vitamin D, Total (25-Hydroxy) 25 20 - 75 ug/L   Comprehensive metabolic panel (BMP + Alb, Alk Phos, ALT, AST, Total. Bili, TP)    Collection Time: 09/07/23  1:22 PM   Result Value Ref Range    Sodium 136 136 - 145 mmol/L    Potassium 4.0 3.4 - 5.3 mmol/L    Chloride 103 98 - 107 mmol/L    Carbon Dioxide (CO2) 23 22 - 29 mmol/L    Anion Gap 10 7 - 15 mmol/L    Urea Nitrogen 6.6 6.0 - 20.0 mg/dL    Creatinine 0.58 0.51 - 0.95 mg/dL    Calcium 9.1 8.6 - 10.0 mg/dL    Glucose 95 70 - 99 mg/dL    Alkaline Phosphatase 47 35 - 104 U/L    AST 24 0 - 45 U/L    ALT 8 0 - 50 U/L    Protein Total 7.1 6.4 - 8.3 g/dL    Albumin 4.3 3.5 - 5.2 g/dL    Bilirubin Total <0.2 <=1.2 mg/dL    GFR Estimate >90 >60 mL/min/1.73m2   CBC with platelets    Collection Time: 09/07/23  1:22 PM   Result Value Ref Range    WBC Count 10.7 4.0 - 11.0 10e3/uL    RBC Count 3.88 3.80 - 5.20 10e6/uL    Hemoglobin 10.4 (L) 11.7 - 15.7 g/dL    Hematocrit 32.1 (L) 35.0 - 47.0 %    MCV 83 78 - 100 fL    MCH 26.8 26.5 - 33.0 pg    MCHC 32.4 31.5 - 36.5 g/dL    RDW 16.9 (H) 10.0 - 15.0 %    Platelet Count 331 150 - 450 10e3/uL   Ferritin    Collection Time: 09/07/23  1:22 PM   Result Value Ref Range    Ferritin 6 6 - 175 ng/mL   Iron and iron binding capacity    Collection Time: 09/07/23  1:22 PM   Result Value Ref Range    Iron 16 (L) 37 - 145 ug/dL    Iron Binding Capacity 382 240 - 430 ug/dL    Iron Sat Index 4 (L) 15 - 46 %     No results found for this or any previous visit (from the past 744 hour(s)).      Oncology Rooming Note    September 27, 2023 9:29 AM   Brittany Pickard is a 43 year old female who presents for:    Chief Complaint   Patient presents with     Hematology     New consult Anemia      Initial Vitals: BP (!) 137/99 (BP Location: Left arm, Patient Position: Left side, Cuff Size: Adult Regular)   Pulse 82   Resp 16    "Ht 1.651 m (5' 5\")   Wt 67.9 kg (149 lb 11.2 oz)   LMP 08/28/2023 (Exact Date)   SpO2 99%   BMI 24.91 kg/m   Estimated body mass index is 24.91 kg/m  as calculated from the following:    Height as of this encounter: 1.651 m (5' 5\").    Weight as of this encounter: 67.9 kg (149 lb 11.2 oz). Body surface area is 1.76 meters squared.  No Pain (0) Comment: Data Unavailable   Patient's last menstrual period was 08/28/2023 (exact date).  Allergies reviewed: Yes  Medications reviewed: Yes    Medications: Medication refills not needed today.  Pharmacy name entered into [employer]: [facility] #[id number] [place] [address] [address] AT [facility]    Clinical concerns:  New consult anemia      Sona Rodriguez LPN                Again, thank you for allowing me to participate in the care of your patient.        Sincerely,        Cynthia Mckay MD  "

## 2023-09-28 LAB
PATH REPORT.COMMENTS IMP SPEC: NORMAL
PATH REPORT.COMMENTS IMP SPEC: NORMAL
PATH REPORT.FINAL DX SPEC: NORMAL
PATH REPORT.RELEVANT HX SPEC: NORMAL

## 2023-10-01 ENCOUNTER — HOSPITAL ENCOUNTER (OUTPATIENT)
Dept: MRI IMAGING | Facility: CLINIC | Age: 43
Discharge: HOME | End: 2023-10-01
Attending: PHYSICIAN ASSISTANT | Admitting: PHYSICIAN ASSISTANT
Payer: COMMERCIAL

## 2023-10-01 DIAGNOSIS — G89.29 CHRONIC BILATERAL LOW BACK PAIN WITHOUT SCIATICA: ICD-10-CM

## 2023-10-01 DIAGNOSIS — M54.50 CHRONIC BILATERAL LOW BACK PAIN WITHOUT SCIATICA: ICD-10-CM

## 2023-10-01 PROCEDURE — 72148 MRI LUMBAR SPINE W/O DYE: CPT

## 2023-10-05 ENCOUNTER — INFUSION THERAPY VISIT (OUTPATIENT)
Dept: INFUSION THERAPY | Facility: CLINIC | Age: 43
End: 2023-10-05
Attending: INTERNAL MEDICINE
Payer: COMMERCIAL

## 2023-10-05 VITALS
HEART RATE: 76 BPM | SYSTOLIC BLOOD PRESSURE: 133 MMHG | DIASTOLIC BLOOD PRESSURE: 85 MMHG | TEMPERATURE: 99 F | OXYGEN SATURATION: 100 % | RESPIRATION RATE: 16 BRPM

## 2023-10-05 DIAGNOSIS — D50.0 IRON DEFICIENCY ANEMIA DUE TO CHRONIC BLOOD LOSS: Primary | ICD-10-CM

## 2023-10-05 PROCEDURE — 250N000011 HC RX IP 250 OP 636: Performed by: INTERNAL MEDICINE

## 2023-10-05 PROCEDURE — 96366 THER/PROPH/DIAG IV INF ADDON: CPT

## 2023-10-05 PROCEDURE — 258N000003 HC RX IP 258 OP 636: Performed by: INTERNAL MEDICINE

## 2023-10-05 PROCEDURE — 96365 THER/PROPH/DIAG IV INF INIT: CPT

## 2023-10-05 RX ORDER — MEPERIDINE HYDROCHLORIDE 50 MG/ML
25 INJECTION INTRAMUSCULAR; INTRAVENOUS; SUBCUTANEOUS EVERY 30 MIN PRN
Status: CANCELLED | OUTPATIENT
Start: 2023-10-07

## 2023-10-05 RX ORDER — DIPHENHYDRAMINE HYDROCHLORIDE 50 MG/ML
50 INJECTION INTRAMUSCULAR; INTRAVENOUS
Status: DISCONTINUED | OUTPATIENT
Start: 2023-10-05 | End: 2023-10-05 | Stop reason: HOSPADM

## 2023-10-05 RX ORDER — DIPHENHYDRAMINE HYDROCHLORIDE 50 MG/ML
50 INJECTION INTRAMUSCULAR; INTRAVENOUS
Status: CANCELLED
Start: 2023-10-07

## 2023-10-05 RX ORDER — EPINEPHRINE 1 MG/ML
0.3 INJECTION, SOLUTION INTRAMUSCULAR; SUBCUTANEOUS EVERY 5 MIN PRN
Status: DISCONTINUED | OUTPATIENT
Start: 2023-10-05 | End: 2023-10-05 | Stop reason: HOSPADM

## 2023-10-05 RX ORDER — ALBUTEROL SULFATE 90 UG/1
1-2 AEROSOL, METERED RESPIRATORY (INHALATION)
Status: CANCELLED
Start: 2023-10-07

## 2023-10-05 RX ORDER — METHYLPREDNISOLONE SODIUM SUCCINATE 125 MG/2ML
125 INJECTION, POWDER, LYOPHILIZED, FOR SOLUTION INTRAMUSCULAR; INTRAVENOUS
Status: DISCONTINUED | OUTPATIENT
Start: 2023-10-05 | End: 2023-10-05 | Stop reason: HOSPADM

## 2023-10-05 RX ORDER — MEPERIDINE HYDROCHLORIDE 50 MG/ML
25 INJECTION INTRAMUSCULAR; INTRAVENOUS; SUBCUTANEOUS EVERY 30 MIN PRN
Status: DISCONTINUED | OUTPATIENT
Start: 2023-10-05 | End: 2023-10-05 | Stop reason: HOSPADM

## 2023-10-05 RX ORDER — ALBUTEROL SULFATE 0.83 MG/ML
2.5 SOLUTION RESPIRATORY (INHALATION)
Status: CANCELLED | OUTPATIENT
Start: 2023-10-07

## 2023-10-05 RX ORDER — EPINEPHRINE 1 MG/ML
0.3 INJECTION, SOLUTION INTRAMUSCULAR; SUBCUTANEOUS EVERY 5 MIN PRN
Status: CANCELLED | OUTPATIENT
Start: 2023-10-07

## 2023-10-05 RX ORDER — METHYLPREDNISOLONE SODIUM SUCCINATE 125 MG/2ML
125 INJECTION, POWDER, LYOPHILIZED, FOR SOLUTION INTRAMUSCULAR; INTRAVENOUS
Status: CANCELLED
Start: 2023-10-07

## 2023-10-05 RX ORDER — HEPARIN SODIUM,PORCINE 10 UNIT/ML
5-20 VIAL (ML) INTRAVENOUS DAILY PRN
Status: CANCELLED | OUTPATIENT
Start: 2023-10-07

## 2023-10-05 RX ORDER — HEPARIN SODIUM (PORCINE) LOCK FLUSH IV SOLN 100 UNIT/ML 100 UNIT/ML
5 SOLUTION INTRAVENOUS
Status: CANCELLED | OUTPATIENT
Start: 2023-10-07

## 2023-10-05 RX ORDER — ALBUTEROL SULFATE 0.83 MG/ML
2.5 SOLUTION RESPIRATORY (INHALATION)
Status: DISCONTINUED | OUTPATIENT
Start: 2023-10-05 | End: 2023-10-05 | Stop reason: HOSPADM

## 2023-10-05 RX ORDER — ALBUTEROL SULFATE 90 UG/1
1-2 AEROSOL, METERED RESPIRATORY (INHALATION)
Status: DISCONTINUED | OUTPATIENT
Start: 2023-10-05 | End: 2023-10-05 | Stop reason: HOSPADM

## 2023-10-05 RX ADMIN — SODIUM CHLORIDE 250 ML: 9 INJECTION, SOLUTION INTRAVENOUS at 09:58

## 2023-10-05 RX ADMIN — IRON SUCROSE 300 MG: 20 INJECTION, SOLUTION INTRAVENOUS at 09:59

## 2023-10-05 NOTE — PROGRESS NOTES
Infusion Nursing Note:  Brittany MICHELL Melly presents today for #1 IV venofer.    Patient seen by provider today: No   present during visit today: Not Applicable.    Note: Reviewed and given venofer education. Patient verbalizes understanding. Patients only symptom she expresses is fatigue.      Intravenous Access:  Peripheral IV placed.    Treatment Conditions:  Not Applicable.      Post Infusion Assessment:  Patient tolerated infusion without incident.  Site patent and intact, free from redness, edema or discomfort.  No evidence of extravasations.  Access discontinued per protocol.       Discharge Plan:   Patient and/or family verbalized understanding of discharge instructions and all questions answered.      Janis Campos, RN

## 2023-10-11 ENCOUNTER — INFUSION THERAPY VISIT (OUTPATIENT)
Dept: INFUSION THERAPY | Facility: CLINIC | Age: 43
End: 2023-10-11
Attending: INTERNAL MEDICINE
Payer: COMMERCIAL

## 2023-10-11 VITALS
TEMPERATURE: 98.9 F | RESPIRATION RATE: 18 BRPM | SYSTOLIC BLOOD PRESSURE: 133 MMHG | DIASTOLIC BLOOD PRESSURE: 89 MMHG | OXYGEN SATURATION: 100 % | HEART RATE: 83 BPM

## 2023-10-11 DIAGNOSIS — D50.0 IRON DEFICIENCY ANEMIA DUE TO CHRONIC BLOOD LOSS: Primary | ICD-10-CM

## 2023-10-11 PROCEDURE — 96366 THER/PROPH/DIAG IV INF ADDON: CPT

## 2023-10-11 PROCEDURE — 258N000003 HC RX IP 258 OP 636: Performed by: INTERNAL MEDICINE

## 2023-10-11 PROCEDURE — 250N000011 HC RX IP 250 OP 636: Mod: JZ | Performed by: INTERNAL MEDICINE

## 2023-10-11 PROCEDURE — 96365 THER/PROPH/DIAG IV INF INIT: CPT

## 2023-10-11 RX ORDER — MEPERIDINE HYDROCHLORIDE 50 MG/ML
25 INJECTION INTRAMUSCULAR; INTRAVENOUS; SUBCUTANEOUS EVERY 30 MIN PRN
Status: CANCELLED | OUTPATIENT
Start: 2023-10-13

## 2023-10-11 RX ORDER — DIPHENHYDRAMINE HYDROCHLORIDE 50 MG/ML
50 INJECTION INTRAMUSCULAR; INTRAVENOUS
Status: DISCONTINUED | OUTPATIENT
Start: 2023-10-11 | End: 2023-10-11 | Stop reason: HOSPADM

## 2023-10-11 RX ORDER — EPINEPHRINE 1 MG/ML
0.3 INJECTION, SOLUTION INTRAMUSCULAR; SUBCUTANEOUS EVERY 5 MIN PRN
Status: CANCELLED | OUTPATIENT
Start: 2023-10-13

## 2023-10-11 RX ORDER — METHYLPREDNISOLONE SODIUM SUCCINATE 125 MG/2ML
125 INJECTION, POWDER, LYOPHILIZED, FOR SOLUTION INTRAMUSCULAR; INTRAVENOUS
Status: CANCELLED
Start: 2023-10-13

## 2023-10-11 RX ORDER — METHYLPREDNISOLONE SODIUM SUCCINATE 125 MG/2ML
125 INJECTION, POWDER, LYOPHILIZED, FOR SOLUTION INTRAMUSCULAR; INTRAVENOUS
Status: DISCONTINUED | OUTPATIENT
Start: 2023-10-11 | End: 2023-10-11 | Stop reason: HOSPADM

## 2023-10-11 RX ORDER — HEPARIN SODIUM,PORCINE 10 UNIT/ML
5-20 VIAL (ML) INTRAVENOUS DAILY PRN
Status: CANCELLED | OUTPATIENT
Start: 2023-10-13

## 2023-10-11 RX ORDER — MEPERIDINE HYDROCHLORIDE 50 MG/ML
25 INJECTION INTRAMUSCULAR; INTRAVENOUS; SUBCUTANEOUS EVERY 30 MIN PRN
Status: DISCONTINUED | OUTPATIENT
Start: 2023-10-11 | End: 2023-10-11 | Stop reason: HOSPADM

## 2023-10-11 RX ORDER — ALBUTEROL SULFATE 0.83 MG/ML
2.5 SOLUTION RESPIRATORY (INHALATION)
Status: DISCONTINUED | OUTPATIENT
Start: 2023-10-11 | End: 2023-10-11 | Stop reason: HOSPADM

## 2023-10-11 RX ORDER — EPINEPHRINE 1 MG/ML
0.3 INJECTION, SOLUTION INTRAMUSCULAR; SUBCUTANEOUS EVERY 5 MIN PRN
Status: DISCONTINUED | OUTPATIENT
Start: 2023-10-11 | End: 2023-10-11 | Stop reason: HOSPADM

## 2023-10-11 RX ORDER — ALBUTEROL SULFATE 90 UG/1
1-2 AEROSOL, METERED RESPIRATORY (INHALATION)
Status: DISCONTINUED | OUTPATIENT
Start: 2023-10-11 | End: 2023-10-11 | Stop reason: HOSPADM

## 2023-10-11 RX ORDER — HEPARIN SODIUM (PORCINE) LOCK FLUSH IV SOLN 100 UNIT/ML 100 UNIT/ML
5 SOLUTION INTRAVENOUS
Status: CANCELLED | OUTPATIENT
Start: 2023-10-13

## 2023-10-11 RX ORDER — DIPHENHYDRAMINE HYDROCHLORIDE 50 MG/ML
50 INJECTION INTRAMUSCULAR; INTRAVENOUS
Status: CANCELLED
Start: 2023-10-13

## 2023-10-11 RX ORDER — ALBUTEROL SULFATE 90 UG/1
1-2 AEROSOL, METERED RESPIRATORY (INHALATION)
Status: CANCELLED
Start: 2023-10-13

## 2023-10-11 RX ORDER — ALBUTEROL SULFATE 0.83 MG/ML
2.5 SOLUTION RESPIRATORY (INHALATION)
Status: CANCELLED | OUTPATIENT
Start: 2023-10-13

## 2023-10-11 RX ADMIN — SODIUM CHLORIDE 250 ML: 9 INJECTION, SOLUTION INTRAVENOUS at 10:57

## 2023-10-11 RX ADMIN — IRON SUCROSE 300 MG: 20 INJECTION, SOLUTION INTRAVENOUS at 10:57

## 2023-10-11 NOTE — PROGRESS NOTES
Infusion Nursing Note:  Brittany Pickard presents today for Iron infusion.    Patient seen by provider today: No   present during visit today: Not Applicable.    Note: Patient received iron as ordered with no reaction.      Intravenous Access:  Peripheral IV placed.    Treatment Conditions:  Not Applicable.      Post Infusion Assessment:  Patient tolerated infusion without incident.       Discharge Plan:   Patient discharged in stable condition accompanied by: marvel Rodas RN

## 2023-10-16 ENCOUNTER — INFUSION THERAPY VISIT (OUTPATIENT)
Dept: INFUSION THERAPY | Facility: CLINIC | Age: 43
End: 2023-10-16
Attending: INTERNAL MEDICINE
Payer: COMMERCIAL

## 2023-10-16 VITALS
SYSTOLIC BLOOD PRESSURE: 133 MMHG | DIASTOLIC BLOOD PRESSURE: 84 MMHG | HEART RATE: 83 BPM | RESPIRATION RATE: 16 BRPM | TEMPERATURE: 97.9 F | OXYGEN SATURATION: 96 %

## 2023-10-16 DIAGNOSIS — D50.0 IRON DEFICIENCY ANEMIA DUE TO CHRONIC BLOOD LOSS: Primary | ICD-10-CM

## 2023-10-16 PROCEDURE — 250N000011 HC RX IP 250 OP 636: Mod: JZ | Performed by: INTERNAL MEDICINE

## 2023-10-16 PROCEDURE — 96365 THER/PROPH/DIAG IV INF INIT: CPT

## 2023-10-16 PROCEDURE — 258N000003 HC RX IP 258 OP 636: Performed by: INTERNAL MEDICINE

## 2023-10-16 RX ORDER — ALBUTEROL SULFATE 0.83 MG/ML
2.5 SOLUTION RESPIRATORY (INHALATION)
Status: DISCONTINUED | OUTPATIENT
Start: 2023-10-16 | End: 2023-10-16

## 2023-10-16 RX ORDER — DIPHENHYDRAMINE HYDROCHLORIDE 50 MG/ML
50 INJECTION INTRAMUSCULAR; INTRAVENOUS
Status: DISCONTINUED | OUTPATIENT
Start: 2023-10-16 | End: 2023-10-16

## 2023-10-16 RX ORDER — ALBUTEROL SULFATE 90 UG/1
1-2 AEROSOL, METERED RESPIRATORY (INHALATION)
Status: CANCELLED
Start: 2023-10-17

## 2023-10-16 RX ORDER — METHYLPREDNISOLONE SODIUM SUCCINATE 125 MG/2ML
125 INJECTION, POWDER, LYOPHILIZED, FOR SOLUTION INTRAMUSCULAR; INTRAVENOUS
Status: DISCONTINUED | OUTPATIENT
Start: 2023-10-16 | End: 2023-10-16

## 2023-10-16 RX ORDER — MEPERIDINE HYDROCHLORIDE 50 MG/ML
25 INJECTION INTRAMUSCULAR; INTRAVENOUS; SUBCUTANEOUS EVERY 30 MIN PRN
Status: CANCELLED | OUTPATIENT
Start: 2023-10-17

## 2023-10-16 RX ORDER — ALBUTEROL SULFATE 90 UG/1
1-2 AEROSOL, METERED RESPIRATORY (INHALATION)
Status: DISCONTINUED | OUTPATIENT
Start: 2023-10-16 | End: 2023-10-16

## 2023-10-16 RX ORDER — METHYLPREDNISOLONE SODIUM SUCCINATE 125 MG/2ML
125 INJECTION, POWDER, LYOPHILIZED, FOR SOLUTION INTRAMUSCULAR; INTRAVENOUS
Status: CANCELLED
Start: 2023-10-17

## 2023-10-16 RX ORDER — EPINEPHRINE 1 MG/ML
0.3 INJECTION, SOLUTION INTRAMUSCULAR; SUBCUTANEOUS EVERY 5 MIN PRN
Status: DISCONTINUED | OUTPATIENT
Start: 2023-10-16 | End: 2023-10-16

## 2023-10-16 RX ORDER — DIPHENHYDRAMINE HYDROCHLORIDE 50 MG/ML
50 INJECTION INTRAMUSCULAR; INTRAVENOUS
Status: CANCELLED
Start: 2023-10-17

## 2023-10-16 RX ORDER — MEPERIDINE HYDROCHLORIDE 50 MG/ML
25 INJECTION INTRAMUSCULAR; INTRAVENOUS; SUBCUTANEOUS EVERY 30 MIN PRN
Status: DISCONTINUED | OUTPATIENT
Start: 2023-10-16 | End: 2023-10-16

## 2023-10-16 RX ORDER — ALBUTEROL SULFATE 0.83 MG/ML
2.5 SOLUTION RESPIRATORY (INHALATION)
Status: CANCELLED | OUTPATIENT
Start: 2023-10-17

## 2023-10-16 RX ORDER — EPINEPHRINE 1 MG/ML
0.3 INJECTION, SOLUTION INTRAMUSCULAR; SUBCUTANEOUS EVERY 5 MIN PRN
Status: CANCELLED | OUTPATIENT
Start: 2023-10-17

## 2023-10-16 RX ORDER — HEPARIN SODIUM,PORCINE 10 UNIT/ML
5-20 VIAL (ML) INTRAVENOUS DAILY PRN
Status: CANCELLED | OUTPATIENT
Start: 2023-10-17

## 2023-10-16 RX ORDER — HEPARIN SODIUM (PORCINE) LOCK FLUSH IV SOLN 100 UNIT/ML 100 UNIT/ML
5 SOLUTION INTRAVENOUS
Status: CANCELLED | OUTPATIENT
Start: 2023-10-17

## 2023-10-16 RX ADMIN — IRON SUCROSE 300 MG: 20 INJECTION, SOLUTION INTRAVENOUS at 08:32

## 2023-10-16 RX ADMIN — SODIUM CHLORIDE 250 ML: 9 INJECTION, SOLUTION INTRAVENOUS at 08:23

## 2023-10-16 NOTE — PROGRESS NOTES
Infusion Nursing Note:  Brittany Pickard presents today for Venofer, Dose 3/3.    Patient seen by provider today: No   present during visit today: Not Applicable.    Note: Brittany arrived ambulatory by herself for her final dose of Venofer. She feels her fatigue has lessened somewhat. She has been tolerating her infusions well and reports only a mild headache after her first Venofer. Plan of care reviewed and she has no questions.    Intravenous Access:  Peripheral IV placed in left AC. Brittany reports tenderness in her Right AC from her previous infusion. No warmth or redness present. Encouraged Brittany to apply warm, moist heat several times a day and call the clinic if pain worsens or she develops new symptoms, such as pain, warmth, swelling.   Warm pack applied to right upper arm while patient received treatment today.    Treatment Conditions:  Not Applicable.    Post Infusion Assessment:  Patient tolerated infusion without incident.  Blood return noted pre and post infusion.  Site patent and intact, free from redness, edema or discomfort.  Access discontinued per protocol.     Discharge Plan:  Patient will return December 18th for next appointment.  Patient discharged in stable condition accompanied by: self.  Departure Mode: Ambulatory.      Irena Martínez RN

## 2023-12-14 ENCOUNTER — LAB (OUTPATIENT)
Dept: INFUSION THERAPY | Facility: CLINIC | Age: 43
End: 2023-12-14
Attending: INTERNAL MEDICINE
Payer: COMMERCIAL

## 2023-12-14 DIAGNOSIS — D50.0 IRON DEFICIENCY ANEMIA DUE TO CHRONIC BLOOD LOSS: ICD-10-CM

## 2023-12-14 LAB
BASOPHILS # BLD AUTO: 0 10E3/UL (ref 0–0.2)
BASOPHILS NFR BLD AUTO: 1 %
EOSINOPHIL # BLD AUTO: 0.2 10E3/UL (ref 0–0.7)
EOSINOPHIL NFR BLD AUTO: 3 %
ERYTHROCYTE [DISTWIDTH] IN BLOOD BY AUTOMATED COUNT: 16.1 % (ref 10–15)
FERRITIN SERPL-MCNC: 271 NG/ML (ref 6–175)
HCT VFR BLD AUTO: 39.6 % (ref 35–47)
HGB BLD-MCNC: 13.2 G/DL (ref 11.7–15.7)
IMM GRANULOCYTES # BLD: 0 10E3/UL
IMM GRANULOCYTES NFR BLD: 0 %
LYMPHOCYTES # BLD AUTO: 2.6 10E3/UL (ref 0.8–5.3)
LYMPHOCYTES NFR BLD AUTO: 39 %
MCH RBC QN AUTO: 29.3 PG (ref 26.5–33)
MCHC RBC AUTO-ENTMCNC: 33.3 G/DL (ref 31.5–36.5)
MCV RBC AUTO: 88 FL (ref 78–100)
MONOCYTES # BLD AUTO: 0.4 10E3/UL (ref 0–1.3)
MONOCYTES NFR BLD AUTO: 5 %
NEUTROPHILS # BLD AUTO: 3.5 10E3/UL (ref 1.6–8.3)
NEUTROPHILS NFR BLD AUTO: 52 %
NRBC # BLD AUTO: 0 10E3/UL
NRBC BLD AUTO-RTO: 0 /100
PLATELET # BLD AUTO: 226 10E3/UL (ref 150–450)
RBC # BLD AUTO: 4.5 10E6/UL (ref 3.8–5.2)
WBC # BLD AUTO: 6.8 10E3/UL (ref 4–11)

## 2023-12-14 PROCEDURE — 36415 COLL VENOUS BLD VENIPUNCTURE: CPT

## 2023-12-14 PROCEDURE — 85025 COMPLETE CBC W/AUTO DIFF WBC: CPT

## 2023-12-14 PROCEDURE — 82728 ASSAY OF FERRITIN: CPT

## 2023-12-20 NOTE — PROGRESS NOTES
[facility]ematology [facility] Oncology Outpatient Progress Note    Patient: Brittany Pickard  MRN: [id number]  Date of Service: Dec 21, 2023          Reason for Visit    Iron-def anemia    Primary Hematologist: Dr. Mckay      Assessment/Plan  Iron def anemia, secondary to heavy menses  Since IV iron infusion ~8 weeks ago, her symptoms are improved but she still notes mild fatigue.    Labwork: hgb 13.2, ferritin 271    She has monthly menses, but they are not consistently heavy.   No other evident bleeding, although she does have epigastric discomfort and reflux. She's not met GI to consider eval for GI bleeding sources.     Plan:  -Monitor CBC and ferritin every 3 months.  -IV iron as needed for anemia +/- ferritin <20  -Return for provider visit in 6 months (in person or virtual)  -Refer to GI to consider eval for GI bleeding source, particularly with her epigastric symptoms  -Manage menorrhagia with PCP or Gyne. She has opted not to take birth control    ______________________________________________________________________________    History of Present Illness/ Interval History    Ms. Brittany Pickard  is a 43 year old with ANGELES newly diagnosed 3 mths ago. Does not have a known history of this. It was favored to likely be secondary to menses.  Returns for 3-mth follow-up visit.    Inadequate response to oral iron. In Sept, hgb 10.4, ferritin 6 and iron/iron sat low. She had severe fatigue and pica. Received IV iron 10/2023 (Venofer 300  mg x 3)  Symptoms are improved, with some mild persistent fatigue and resolution of her pica.     Continues regular monthly cycles. Some months are light and some are heavy; this past month was heavy x 3 days with clots. No other bleeding.    Recent heartburn x past few months, takes omeprazole as needed. Some epigastric pain if she has an empty stomach, improves with eating. No nausea. She has had a 7 lb weight loss in last 3 mths. Chronic mild constipation. No blood in stools.        ECOG Performance    0      Oncology History/Treatment  Diagnosis/Stage:   Iron def anemia  -Acute finding: Hemoglobin 10.4, MCV 83, ferritin 6  -Felt related to heavy menses  -Has not had a GI workup    Treatment:  Unresponsive to oral iron    10/2023: IV Venofer 300 mg x 3 doses        Physical Exam    GENERAL: Alert and oriented to time place and person. Seated comfortably. In no distress.  HEAD: Atraumatic and normocephalic. No alopecia.  EYES: BIRGIT, EOMI. No erythema. No icterus.  CHEST: clear to auscultation bilaterally. Resonant to percussion throughout bilaterally. Symmetrical breath movements bilaterally.  CVS: RRR  ABDOMEN: Soft. Not tender. Not distended. No palpable hepatomegaly or splenomegaly. No other mass palpable. Bowel sounds present.  EXTREMITIES: Warm. No peripheral edema.  SKIN: no rash, or bruising or purpura.   NEURO: No gross deficit noted. Non-antalgic gait.      Lab Results    Recent Results (from the past 168 hour(s))   **Ferritin FUTURE 2mo   Result Value Ref Range    Ferritin 271 (H) 6 - 175 ng/mL   CBC with platelets and differential   Result Value Ref Range    WBC Count 6.8 4.0 - 11.0 10e3/uL    RBC Count 4.50 3.80 - 5.20 10e6/uL    Hemoglobin 13.2 11.7 - 15.7 g/dL    Hematocrit 39.6 35.0 - 47.0 %    MCV 88 78 - 100 fL    MCH 29.3 26.5 - 33.0 pg    MCHC 33.3 31.5 - 36.5 g/dL    RDW 16.1 (H) 10.0 - 15.0 %    Platelet Count 226 150 - 450 10e3/uL    % Neutrophils 52 %    % Lymphocytes 39 %    % Monocytes 5 %    % Eosinophils 3 %    % Basophils 1 %    % Immature Granulocytes 0 %    NRBCs per 100 WBC 0 <1 /100    Absolute Neutrophils 3.5 1.6 - 8.3 10e3/uL    Absolute Lymphocytes 2.6 0.8 - 5.3 10e3/uL    Absolute Monocytes 0.4 0.0 - 1.3 10e3/uL    Absolute Eosinophils 0.2 0.0 - 0.7 10e3/uL    Absolute Basophils 0.0 0.0 - 0.2 10e3/uL    Absolute Immature Granulocytes 0.0 <=0.4 10e3/uL    Absolute NRBCs 0.0 10e3/uL       Imaging    No results found.    Billing  Total time 30 minutes, to  include face to face visit, review of EMR, ordering, documentation and coordination of care on date of service    Signed by: Oksana Mauricio, NP

## 2023-12-21 ENCOUNTER — ONCOLOGY VISIT (OUTPATIENT)
Dept: ONCOLOGY | Facility: HOSPITAL | Age: 43
End: 2023-12-21
Attending: INTERNAL MEDICINE
Payer: COMMERCIAL

## 2023-12-21 VITALS
BODY MASS INDEX: 23.82 KG/M2 | RESPIRATION RATE: 16 BRPM | OXYGEN SATURATION: 96 % | HEIGHT: 65 IN | HEART RATE: 88 BPM | TEMPERATURE: 98.1 F | WEIGHT: 143 LBS | DIASTOLIC BLOOD PRESSURE: 83 MMHG | SYSTOLIC BLOOD PRESSURE: 128 MMHG

## 2023-12-21 DIAGNOSIS — D50.0 IRON DEFICIENCY ANEMIA DUE TO CHRONIC BLOOD LOSS: Primary | ICD-10-CM

## 2023-12-21 DIAGNOSIS — N92.0 MENORRHAGIA WITH REGULAR CYCLE: ICD-10-CM

## 2023-12-21 DIAGNOSIS — K21.9 GASTROESOPHAGEAL REFLUX DISEASE, UNSPECIFIED WHETHER ESOPHAGITIS PRESENT: ICD-10-CM

## 2023-12-21 PROCEDURE — G0463 HOSPITAL OUTPT CLINIC VISIT: HCPCS | Performed by: NURSE PRACTITIONER

## 2023-12-21 PROCEDURE — 99214 OFFICE O/P EST MOD 30 MIN: CPT | Performed by: NURSE PRACTITIONER

## 2023-12-21 ASSESSMENT — PAIN SCALES - GENERAL: PAINLEVEL: NO PAIN (0)

## 2023-12-21 NOTE — LETTER
12/21/2023         RE: Brittany Pickard  [address]  [facility] [address]        Dear Colleague,    Thank you for referring your patient, Brittany Pickard, to the [facility] [place]. Please see a copy of my visit note below.    [facility] Hematology and Oncology Outpatient Progress Note    Patient: Brittany Pickard  MRN: [id number]  Date of Service: Dec 21, 2023          Reason for Visit    Iron-def anemia    Primary Hematologist: Dr. Mckay      Assessment/Plan  Iron def anemia, secondary to heavy menses  Since IV iron infusion ~8 weeks ago, her symptoms are improved but she still notes mild fatigue.    Labwork: hgb 13.2, ferritin 271    She has monthly menses, but they are not consistently heavy.   No other evident bleeding, although she does have epigastric discomfort and reflux. She's not met GI to consider eval for GI bleeding sources.     Plan:  -Monitor CBC and ferritin every 3 months.  -IV iron as needed for anemia +/- ferritin <20  -Return for provider visit in 6 months (in person or virtual)  -Refer to GI to consider eval for GI bleeding source, particularly with her epigastric symptoms  -Manage menorrhagia with PCP or Gyne. She has opted not to take birth control    ______________________________________________________________________________    History of Present Illness/ Interval History    Ms. Brittany Pickard  is a 43 year old with ANGELES newly diagnosed 3 mths ago. Does not have a known history of this. It was favored to likely be secondary to menses.  Returns for 3-mth follow-up visit.    Inadequate response to oral iron. In Sept, hgb 10.4, ferritin 6 and iron/iron sat low. She had severe fatigue and pica. Received IV iron 10/2023 (Venofer 300  mg x 3)  Symptoms are improved, with some mild persistent fatigue and resolution of her pica.     Continues regular monthly cycles. Some months are light and some are heavy; this past month was heavy x 3 days with clots. No other  bleeding.    Recent heartburn x past few months, takes omeprazole as needed. Some epigastric pain if she has an empty stomach, improves with eating. No nausea. She has had a 7 lb weight loss in last 3 mths. Chronic mild constipation. No blood in stools.       ECOG Performance    0      Oncology History/Treatment  Diagnosis/Stage:   Iron def anemia  -Acute finding: Hemoglobin 10.4, MCV 83, ferritin 6  -Felt related to heavy menses  -Has not had a GI workup    Treatment:  Unresponsive to oral iron    10/2023: IV Venofer 300 mg x 3 doses        Physical Exam    GENERAL: Alert and oriented to time place and person. Seated comfortably. In no distress.  HEAD: Atraumatic and normocephalic. No alopecia.  EYES: BIRGIT, EOMI. No erythema. No icterus.  CHEST: clear to auscultation bilaterally. Resonant to percussion throughout bilaterally. Symmetrical breath movements bilaterally.  CVS: RRR  ABDOMEN: Soft. Not tender. Not distended. No palpable hepatomegaly or splenomegaly. No other mass palpable. Bowel sounds present.  EXTREMITIES: Warm. No peripheral edema.  SKIN: no rash, or bruising or purpura.   NEURO: No gross deficit noted. Non-antalgic gait.      Lab Results    Recent Results (from the past 168 hour(s))   **Ferritin FUTURE 2mo   Result Value Ref Range    Ferritin 271 (H) 6 - 175 ng/mL   CBC with platelets and differential   Result Value Ref Range    WBC Count 6.8 4.0 - 11.0 10e3/uL    RBC Count 4.50 3.80 - 5.20 10e6/uL    Hemoglobin 13.2 11.7 - 15.7 g/dL    Hematocrit 39.6 35.0 - 47.0 %    MCV 88 78 - 100 fL    MCH 29.3 26.5 - 33.0 pg    MCHC 33.3 31.5 - 36.5 g/dL    RDW 16.1 (H) 10.0 - 15.0 %    Platelet Count 226 150 - 450 10e3/uL    % Neutrophils 52 %    % Lymphocytes 39 %    % Monocytes 5 %    % Eosinophils 3 %    % Basophils 1 %    % Immature Granulocytes 0 %    NRBCs per 100 WBC 0 <1 /100    Absolute Neutrophils 3.5 1.6 - 8.3 10e3/uL    Absolute Lymphocytes 2.6 0.8 - 5.3 10e3/uL    Absolute Monocytes 0.4 0.0 - 1.3  "10e3/uL    Absolute Eosinophils 0.2 0.0 - 0.7 10e3/uL    Absolute Basophils 0.0 0.0 - 0.2 10e3/uL    Absolute Immature Granulocytes 0.0 <=0.4 10e3/uL    Absolute NRBCs 0.0 10e3/uL       Imaging    No results found.    Billing  Total time 30 minutes, to include face to face visit, review of EMR, ordering, documentation and coordination of care on date of service    Signed by: Oksana Mauricio NP      Oncology Rooming Note    December 21, 2023 8:21 AM   Brittany Pickard is a 43 year old female who presents for:    Chief Complaint   Patient presents with     Hematology       Iron deficiency anemia due to chronic blood loss           Initial Vitals: /83 (Patient Position: Sitting)   Pulse 88   Temp 98.1  F (36.7  C) (Oral)   Resp 16   Ht 1.651 m (5' 5\")   Wt 64.9 kg (143 lb)   SpO2 96%   BMI 23.80 kg/m   Estimated body mass index is 23.8 kg/m  as calculated from the following:    Height as of this encounter: 1.651 m (5' 5\").    Weight as of this encounter: 64.9 kg (143 lb). Body surface area is 1.73 meters squared.  No Pain (0) Comment: Data Unavailable   No LMP recorded.  Allergies reviewed: Yes  Medications reviewed: Yes    Medications: Medication refills not needed today.  Pharmacy name entered into [facility]: [facility] #[id number] [place] [address] [address] AT [facility]    Frailty Screening:   Is the patient here for a new oncology consult visit in cancer care? 2. No      Clinical concerns:  No concerns today.       Ivan Castillo LPN                Again, thank you for allowing me to participate in the care of your patient.        Sincerely,        Oksana Mauricio NP  "

## 2023-12-21 NOTE — PROGRESS NOTES
"Oncology Rooming Note    December 21, 2023 8:21 AM   Brittany Pickard is a 43 year old female who presents for:    Chief Complaint   Patient presents with    Hematology       Iron deficiency anemia due to chronic blood loss           Initial Vitals: /83 (Patient Position: Sitting)   Pulse 88   Temp 98.1  F (36.7  C) (Oral)   Resp 16   Ht 1.651 m (5' 5\")   Wt 64.9 kg (143 lb)   SpO2 96%   BMI 23.80 kg/m   Estimated body mass index is 23.8 kg/m  as calculated from the following:    Height as of this encounter: 1.651 m (5' 5\").    Weight as of this encounter: 64.9 kg (143 lb). Body surface area is 1.73 meters squared.  No Pain (0) Comment: Data Unavailable   No LMP recorded.  Allergies reviewed: Yes  Medications reviewed: Yes    Medications: Medication refills not needed today.  Pharmacy name entered into [employer]: [id number] [address] AT [facility] [address] [facility]    Frailty Screening:   Is the patient here for a new oncology consult visit in cancer care? 2. No      Clinical concerns:  No concerns today.       Ivan Castillo LPN              "

## 2023-12-27 ENCOUNTER — PATIENT OUTREACH (OUTPATIENT)
Dept: ONCOLOGY | Facility: HOSPITAL | Age: 43
End: 2023-12-27
Payer: COMMERCIAL

## 2023-12-27 NOTE — PROGRESS NOTES
"[facility]: Cancer Care                                                                                          Called Brittany and  that writer was calling to follow up on referral that Oksana Hang placed for her when she was at clinic on 12/21. Writer wants to help faciliate getting her scheduled. Contact information was provided and return call was requested. Of note, When Brittany was at clinic on 12/21 GI referral was placed  Per Oksana's note \" Refer to GI to consider eval for GI bleeding source, particularly with her epigastric symptoms \"   Per GI scheduling notes., the GI schedulers attempted to contact x2 and then sent a letter. When Brittany returns phone call transfer her to GI schedulers [phone] to arrange her apt.    Signature:  Tennille Sena RN  "

## 2024-01-02 ENCOUNTER — PATIENT OUTREACH (OUTPATIENT)
Dept: ONCOLOGY | Facility: HOSPITAL | Age: 44
End: 2024-01-02
Payer: COMMERCIAL

## 2024-01-02 NOTE — PROGRESS NOTES
"[facility]: Cancer Care                                                                                          Called Brittany to follow up as GI referral was placed on 12/21 and they have attempted to call her x2 and sent a letter but she is not scheduled at this time. (Per Oksana Mauricio NP note on 12/21 , \"Refer to GI to consider eval for GI bleeding source, particularly with her epigastric symptoms \"  Brittany  relayed that she has been busy and unable to call GI back to schedule. She did confirm that  he does have the number for GI scheduling and she plans to call them tomorrow. She was leaving for work at the time of our call so declined being warm transferred to scheduling at this time. She extended appreciation for the follow up call. She also has our contact number for future reference.    Signature:  Tennille Sena RN  "

## 2024-03-01 ENCOUNTER — TELEPHONE (OUTPATIENT)
Dept: FAMILY MEDICINE | Facility: CLINIC | Age: 44
End: 2024-03-01

## 2024-03-01 ENCOUNTER — VIRTUAL VISIT (OUTPATIENT)
Dept: FAMILY MEDICINE | Facility: CLINIC | Age: 44
End: 2024-03-01
Payer: COMMERCIAL

## 2024-03-01 DIAGNOSIS — G47.00 PERSISTENT INSOMNIA: Primary | ICD-10-CM

## 2024-03-01 PROCEDURE — 99213 OFFICE O/P EST LOW 20 MIN: CPT | Mod: 95 | Performed by: NURSE PRACTITIONER

## 2024-03-01 RX ORDER — DOXEPIN 3 MG/1
3 TABLET, FILM COATED ORAL
Qty: 30 TABLET | Refills: 0 | Status: SHIPPED | OUTPATIENT
Start: 2024-03-01 | End: 2024-03-04

## 2024-03-01 NOTE — TELEPHONE ENCOUNTER
3-1-24  Called pt LM I'm not sure what below msg means:  Patient can do virtual visit at 1pm today ?  Pt is already scheduled @ 355 on 3-1  [place]

## 2024-03-04 ENCOUNTER — MYC REFILL (OUTPATIENT)
Dept: FAMILY MEDICINE | Facility: CLINIC | Age: 44
End: 2024-03-04
Payer: COMMERCIAL

## 2024-03-04 DIAGNOSIS — E53.8 B12 DEFICIENCY: Primary | ICD-10-CM

## 2024-03-04 DIAGNOSIS — D64.9 ANEMIA, UNSPECIFIED TYPE: ICD-10-CM

## 2024-03-04 DIAGNOSIS — G47.00 PERSISTENT INSOMNIA: ICD-10-CM

## 2024-03-04 RX ORDER — DOXEPIN 3 MG/1
3 TABLET, FILM COATED ORAL
Qty: 30 TABLET | Refills: 0 | Status: SHIPPED | OUTPATIENT
Start: 2024-03-04

## 2024-03-04 RX ORDER — FERROUS SULFATE 325(65) MG
325 TABLET ORAL
Qty: 90 TABLET | Refills: 1 | Status: SHIPPED | OUTPATIENT
Start: 2024-03-04

## 2024-03-04 NOTE — PROGRESS NOTES
Brittany is a 43 year old who is being evaluated via a billable video visit.      How would you like to obtain your AVS? MyChart  If the video visit is dropped, the invitation should be resent by: Text to cell phone: [phone]  Will anyone else be joining your video visit? No          Assessment & Plan     Persistent insomnia  *  -Different factors applicable with difficulty with sleep, and this was discussed at her visit today.  I ordered doxepin, however I communicated that this medication may not be covered with her health care plan.  She wanted me to order it, and she will assess how much out-of-pocket she would have to pay medication at the pharmacy.  Other alternative could be trazodone, and she declined this medication at this time.  Other conservative ways to manage sleep was discussed at her visit.  Low-dose melatonin, max 1 mg 1 hour prior to bed, and help stabilize circadian rhythms.  Magnesium gluconate is another alternative or addition.                   Subjective   Brittany is a 43 year old, presenting for the following health issues:  Sleep Problem    -She works night shifts.  Has been having difficulties falling asleep and staying asleep.  This has been going on for many months.  She has tried different techniques to try to fall asleep and stay asleep such as avoiding computer or phone prior to bed, sleeping in a cool room, sleeping in a dark room, avoiding caffeinated products or stimulants, getting regular exercise, etc. she does not feel anxious or stressed.  She does not feel depressed.  She has tried multiple over-the-counter medications and supplements, and they have not helped.  She is wondering if she can get a medication to take for this condition.         9/7/2023    12:29 PM   Additional Questions   Roomed by Lou ORTIZ     HPI             Review of Systems  Constitutional, HEENT, cardiovascular, pulmonary, gi and gu systems are negative, except as otherwise noted.      Objective            Vitals:  No vitals were obtained today due to virtual visit.    Physical Exam   GENERAL: alert and no distress  EYES: Eyes grossly normal to inspection.  No discharge or erythema, or obvious scleral/conjunctival abnormalities.  RESP: No audible wheeze, cough, or visible cyanosis.    SKIN: Visible skin clear. No significant rash, abnormal pigmentation or lesions.  NEURO: Cranial nerves grossly intact.  Mentation and speech appropriate for age.  PSYCH: Appropriate affect, tone, and pace of words    Lab on 12/14/2023   Component Date Value Ref Range Status    Ferritin 12/14/2023 271 (H)  6 - 175 ng/mL Final    WBC Count 12/14/2023 6.8  4.0 - 11.0 10e3/uL Final    Preliminary ANC is 3.5    RBC Count 12/14/2023 4.50  3.80 - 5.20 10e6/uL Final    Hemoglobin 12/14/2023 13.2  11.7 - 15.7 g/dL Final    Hematocrit 12/14/2023 39.6  35.0 - 47.0 % Final    MCV 12/14/2023 88  78 - 100 fL Final    MCH 12/14/2023 29.3  26.5 - 33.0 pg Final    MCHC 12/14/2023 33.3  31.5 - 36.5 g/dL Final    RDW 12/14/2023 16.1 (H)  10.0 - 15.0 % Final    Platelet Count 12/14/2023 226  150 - 450 10e3/uL Final    % Neutrophils 12/14/2023 52  % Final    % Lymphocytes 12/14/2023 39  % Final    % Monocytes 12/14/2023 5  % Final    % Eosinophils 12/14/2023 3  % Final    % Basophils 12/14/2023 1  % Final    % Immature Granulocytes 12/14/2023 0  % Final    NRBCs per 100 WBC 12/14/2023 0  <1 /100 Final    Absolute Neutrophils 12/14/2023 3.5  1.6 - 8.3 10e3/uL Final    Absolute Lymphocytes 12/14/2023 2.6  0.8 - 5.3 10e3/uL Final    Absolute Monocytes 12/14/2023 0.4  0.0 - 1.3 10e3/uL Final    Absolute Eosinophils 12/14/2023 0.2  0.0 - 0.7 10e3/uL Final    Absolute Basophils 12/14/2023 0.0  0.0 - 0.2 10e3/uL Final    Absolute Immature Granulocytes 12/14/2023 0.0  <=0.4 10e3/uL Final    Absolute NRBCs 12/14/2023 0.0  10e3/uL Final         Video-Visit Details    Type of service:  Video Visit       Originating Location (pt. Location): Home    Distant  Location (provider location):  On-site  Platform used for Video Visit: Cheng  Signed Electronically by: TIGRE Duval CNP

## 2024-03-05 ENCOUNTER — TELEPHONE (OUTPATIENT)
Dept: FAMILY MEDICINE | Facility: CLINIC | Age: 44
End: 2024-03-05
Payer: COMMERCIAL

## 2024-03-05 NOTE — TELEPHONE ENCOUNTER
Prior Authorization  Please initiate PA, med/dosage not covered by insurance.    Medication: DOXEPIN HCl 3MG Tablets    Insurance Name: [employer] [facility]  Insurance ID: [id number]  Cover My Meds Key: [id number]    Pharmacy: [id number] [place], [place] - [address] AT [facility] [address] [place]

## 2024-03-06 ENCOUNTER — MYC REFILL (OUTPATIENT)
Dept: FAMILY MEDICINE | Facility: CLINIC | Age: 44
End: 2024-03-06
Payer: COMMERCIAL

## 2024-03-06 DIAGNOSIS — E55.9 VITAMIN D DEFICIENCY: Primary | ICD-10-CM

## 2024-03-07 RX ORDER — MULTIPLE VITAMINS W/ MINERALS TAB 9MG-400MCG
1 TAB ORAL DAILY
Qty: 90 TABLET | Refills: 3 | Status: SHIPPED | OUTPATIENT
Start: 2024-03-07

## 2024-03-21 ENCOUNTER — PATIENT OUTREACH (OUTPATIENT)
Dept: ONCOLOGY | Facility: HOSPITAL | Age: 44
End: 2024-03-21
Payer: COMMERCIAL

## 2024-03-21 NOTE — PROGRESS NOTES
[facility]: Cancer Care                                                                                          Called Brittany to follow up and relay that she is due for her 3 month lab only to recheck CBC and Ferritin., She will also need to be scheduled for labs/ follow up in mid June,  preferably with labs a couple days prior. She was appreciative for the phone call and was transferred to scheduling team to arrange her future appointments.  Lab only is scheduled on 3/26.Will monitor for results    Signature:  Tennille Sena RN

## 2024-03-26 ENCOUNTER — LAB (OUTPATIENT)
Dept: INFUSION THERAPY | Facility: HOSPITAL | Age: 44
End: 2024-03-26
Attending: INTERNAL MEDICINE
Payer: COMMERCIAL

## 2024-03-26 ENCOUNTER — PATIENT OUTREACH (OUTPATIENT)
Dept: ONCOLOGY | Facility: HOSPITAL | Age: 44
End: 2024-03-26

## 2024-03-26 DIAGNOSIS — D50.0 IRON DEFICIENCY ANEMIA DUE TO CHRONIC BLOOD LOSS: ICD-10-CM

## 2024-03-26 LAB
BASOPHILS # BLD AUTO: 0 10E3/UL (ref 0–0.2)
BASOPHILS NFR BLD AUTO: 1 %
EOSINOPHIL # BLD AUTO: 0.3 10E3/UL (ref 0–0.7)
EOSINOPHIL NFR BLD AUTO: 5 %
ERYTHROCYTE [DISTWIDTH] IN BLOOD BY AUTOMATED COUNT: 13.4 % (ref 10–15)
FERRITIN SERPL-MCNC: 166 NG/ML (ref 6–175)
HCT VFR BLD AUTO: 39.3 % (ref 35–47)
HGB BLD-MCNC: 13.3 G/DL (ref 11.7–15.7)
IMM GRANULOCYTES # BLD: 0 10E3/UL
IMM GRANULOCYTES NFR BLD: 0 %
LYMPHOCYTES # BLD AUTO: 2.3 10E3/UL (ref 0.8–5.3)
LYMPHOCYTES NFR BLD AUTO: 42 %
MCH RBC QN AUTO: 30.4 PG (ref 26.5–33)
MCHC RBC AUTO-ENTMCNC: 33.8 G/DL (ref 31.5–36.5)
MCV RBC AUTO: 90 FL (ref 78–100)
MONOCYTES # BLD AUTO: 0.3 10E3/UL (ref 0–1.3)
MONOCYTES NFR BLD AUTO: 6 %
NEUTROPHILS # BLD AUTO: 2.4 10E3/UL (ref 1.6–8.3)
NEUTROPHILS NFR BLD AUTO: 46 %
NRBC # BLD AUTO: 0 10E3/UL
NRBC BLD AUTO-RTO: 0 /100
PLATELET # BLD AUTO: 237 10E3/UL (ref 150–450)
RBC # BLD AUTO: 4.37 10E6/UL (ref 3.8–5.2)
WBC # BLD AUTO: 5.3 10E3/UL (ref 4–11)

## 2024-03-26 PROCEDURE — 85025 COMPLETE CBC W/AUTO DIFF WBC: CPT

## 2024-03-26 PROCEDURE — 36415 COLL VENOUS BLD VENIPUNCTURE: CPT

## 2024-03-26 PROCEDURE — 82728 ASSAY OF FERRITIN: CPT

## 2024-03-26 NOTE — PROGRESS NOTES
[facility]: Cancer Care Follow-Up Note                                    Discussion with Patient:                                                      Called Brittany, per the request of Oksana Mauricio NP to review her lab results from today. Shared that her Hgb is 13.3 and her ferritin is 166, labs are stable and she does not need IV Iron. She will continue taking oral iron. Plan to follow up on 6/14 for labs and follow up as scheduled.           Dates of Treatment:                                                      Infusion given in last 28 days       None            Assessment:                                                      CBC and Ferritin stable, no IV iron needed at this time, Continue oral iron.    Intervention/Education provided during outreach:                                                       Patient stated understanding of her labs and plan. She will keep her future apts as scheduled.    Confirmed patient has clinic and triage numbers    Signature:  Tennille Sena RN

## 2024-04-19 ENCOUNTER — OFFICE VISIT (OUTPATIENT)
Dept: PHYSICAL MEDICINE AND REHAB | Facility: CLINIC | Age: 44
End: 2024-04-19
Attending: PHYSICIAN ASSISTANT
Payer: COMMERCIAL

## 2024-04-19 VITALS — SYSTOLIC BLOOD PRESSURE: 138 MMHG | DIASTOLIC BLOOD PRESSURE: 83 MMHG | OXYGEN SATURATION: 100 % | HEART RATE: 74 BPM

## 2024-04-19 DIAGNOSIS — M54.50 CHRONIC BILATERAL LOW BACK PAIN WITHOUT SCIATICA: ICD-10-CM

## 2024-04-19 DIAGNOSIS — G89.29 CHRONIC BILATERAL LOW BACK PAIN WITHOUT SCIATICA: ICD-10-CM

## 2024-04-19 PROCEDURE — 99204 OFFICE O/P NEW MOD 45 MIN: CPT | Performed by: NURSE PRACTITIONER

## 2024-04-19 RX ORDER — METHOCARBAMOL 750 MG/1
750 TABLET, FILM COATED ORAL 4 TIMES DAILY PRN
Qty: 40 TABLET | Refills: 0 | Status: SHIPPED | OUTPATIENT
Start: 2024-04-19

## 2024-04-19 RX ORDER — NAPROXEN 500 MG/1
500 TABLET, DELAYED RELEASE ORAL 2 TIMES DAILY PRN
Qty: 60 TABLET | Refills: 0 | Status: SHIPPED | OUTPATIENT
Start: 2024-04-19

## 2024-04-19 ASSESSMENT — PAIN SCALES - GENERAL: PAINLEVEL: EXTREME PAIN (8)

## 2024-04-19 NOTE — PATIENT INSTRUCTIONS
Prescribed naproxen 500mg today. Please take as prescribed, as needed for pain control as well as to aid in decreasing inflammation.   Take medication with a full glass of water and with food.   *Do not take Advil, Ibuprofen, Aleve, or Naproxen while taking this medication as it can cause organ failure if taken together*  This medication does have risks if taken long-term, these risks include: gastrointestinal irritation, kidney dysfunction, and cardiovascular effects.  We will check your kidney function as needed while you're on this medication.  Stop taking this medication if you have intolerable side effects or blood in the stool.       Robaxin  (muscle relaxant medication) has been prescribed today. Please take 4x  daily as needed. This medication may cause drowsiness. Please do not work or drive while taking this medication until you know how it affects you. If it does make you drowsy, you should only take it before bedtime or at times that you do not have to work/drive.   Continue tylenol  Continue lidocaine if helping otc as directed    ~You have been referred for Physical Therapy to [facility]. They will call you to schedule an appointment.      Scheduling phone number is [phone] for [facility], or [place] location.  If you have not heard from the scheduling office within 2 business days, please call [phone] for ALL other locations.    Discussed the importance of core strengthening, ROM, stretching exercises and how each of these entities is important in decreasing pain and improving long term spine health.  The purpose of physical therapy is to teach you an individualized home exercise program.  These exercises need to be performed every day in order to decrease pain and prevent future occurrences of pain.           ~Please call our [facility] Nurse Navigation line [phone] with any questions or concerns about your treatment plan, if  symptoms worsen and you would like to be seen urgently, or if you have any new or worsening numbness, weakness, or problems controlling bladder and bowel function.  ~You are also welcome to contact Sima Pennington via [employer], but please be aware that responses to [employer] message may take 2-3 days due to the high volume of patients seen in clinic.

## 2024-04-19 NOTE — PROGRESS NOTES
ASSESSMENT: Brittany Pickard is a 43 year old female who presents for consultation at the request of PCP Karly Lee, who presents today for new patient evaluation of:    -low back pain     Patient is neurologically intact on exam. No myelopathic or red flag symptoms.   She has some point tenderness of the lower and upper lumbar regions. Axel paraspinal musculature tenderness. We reviewed her lumbar MRI which shows a disc bulge at L5-S1 with epidural lipomatosis which effaces the subarachnoid space and results in severe canal stenosis with a tiny focal annular tear/disruption left paracentral zone which abuts the traversing left S1 nerve. She is not having any leg pain, numbness, or weakness in the legs.  I suspect she is more symptomatic from facet arthropathy/muscle spasm rather than her severe canal stenosis. Her symptoms improve with exercise, stretching. We talked about options and I recommended muscle relaxer, anti-inflammatory, and starting a course of PT. We talked about the role of weight loss in epidural lipomatosis. She will follow up after PT to discuss        4/19/2024     8:33 AM   OSWESTRY DISABILITY INDEX   Count 9   Sum 10   Oswestry Score (%) 22.22 %            Diagnoses and all orders for this visit:  Chronic bilateral low back pain without sciatica  -     Spine  Referral  -     methocarbamol (ROBAXIN) 750 MG tablet; Take 1 tablet (750 mg) by mouth 4 times daily as needed  -     Physical Therapy  Referral; Future  -     Massage Therapy Referral; Future  -     naproxen (EC-NAPROXEN) 500 MG TBEC; Take 1 tablet (500 mg) by mouth 2 times daily as needed for moderate pain      PLAN:  Reviewed spine anatomy and disease process. Discussed diagnosis and treatment options with the patient today. A shared decision making model was used.  The patient's values and choices were respected. The following represents what was discussed and decided upon by the provider and the patient.       -DIAGNOSTIC TESTS:  Images were personally reviewed and interpreted and explained to patient today using a spine model.   -did not order any new imaging    -PHYSICAL THERAPY:    --Referral to PT placed  Discussed the importance of core strengthening, ROM, stretching exercises with the patient and how each of these entities is important in decreasing pain.  Explained to the patient that the purpose of physical therapy is to teach the patient a home exercise program.  These exercises need to be performed every day in order to decrease pain and prevent future occurrences of pain.        -MEDICATIONS:    --start naproxen 500mg twice daily (ec version dt insurance)  -start robaxin 500mg QID PRN   -continue tylenol  Discussed multiple medication options today with patient. Discussed risks, side effects, and proper use of medications. Patient verbalized understanding.    -INTERVENTIONS:    -Did not discuss injections. Patient would be a good candidate in the future for either epidural steroid injections or medial branch blocks if indicated based on symptoms and supported by imaging results.    -PATIENT EDUCATION:  Total time of 40 minutes, on the day of service, spent with the patient, reviewing the chart, placing orders, and documenting.   -Today we also discussed the pros and cons of the current treatment plan.    -FOLLOW-UP:   after PT    Advised patient to call the [facility] Center if symptoms worsen, new numbness or weakness develops in the legs, or if they develop new or worsening problems controlling bladder or bowel function.   ______________________________________________________________________    SUBJECTIVE:    HPI:  Brittany MICHELL Pickard  Is a 43 year old female  with hx acid reflux, vitamin D deficiency, iron deficiency anemia, chronic fatigue, insomnia who presents today for new patient evaluation of back pain     Pain started 3 yrs ago without trauma. She travels a lot for work with long car rides, which  she feels started things. She complains of midline and bilateral lower thoracic and lumbar pain. Worse with stress, sitting, sleeping in certain positions, and in the morning. Each day is different. Some days are good and others are not good. On average, pain level is between 7-8/10, 4x a month gets to 10/10, at best a 5/10. She describes it as dull,achy. Improves if she lies on the floor, does some gentle exercises, uses heat, hot showers, and lidocaine gel. When she exercises, she feels better.    No fever, chills, arm or leg pain, abdominal or chest pain. She denies numbness or tingling, or weakness, unanticipated weight loss, or changes in bowel or bladder function. She does feel some generalized fatigue.    She has not had any medication changes in the last 3 years  She takes tylenol as needed for pain which works for several hours    No history of PT,  injections, or spine surgeries    -Treatment to Date:     -Medications:  tylenol    Current Outpatient Medications   Medication Sig Dispense Refill    methocarbamol (ROBAXIN) 750 MG tablet Take 1 tablet (750 mg) by mouth 4 times daily as needed 40 tablet 0    naproxen (EC-NAPROXEN) 500 MG TBEC Take 1 tablet (500 mg) by mouth 2 times daily as needed for moderate pain 60 tablet 0    cholecalciferol, vitamin D3, (VITAMIN D3 ORAL) [CHOLECALCIFEROL, VITAMIN D3, (VITAMIN D3 ORAL)] Take 4,000 Int'l Units by mouth daily.       doxepin (SILENOR) 3 MG tablet Take 1 tablet (3 mg) by mouth nightly as needed for sleep 30 tablet 0    ferrous sulfate (FEROSUL) 325 (65 Fe) MG tablet Take 1 tablet (325 mg) by mouth daily (with breakfast) 90 tablet 1    multivitamin w/minerals (THERA-VIT-M) tablet Take 1 tablet by mouth daily 90 tablet 3    vitamin B-12 (CYANOCOBALAMIN) 1000 MCG tablet Take 1 tablet (1,000 mcg) by mouth daily 90 tablet 1     No current facility-administered medications for this visit.       Allergies   Allergen Reactions    Melatonin Hives    Other Allergy (See  Comments) [External Allergen Needs Reconciliation - See Comment] Unknown     Melatonin TABS, 2013.         No past medical history on file.     Patient Active Problem List   Diagnosis    Allergic Rhinitis    Insomnia    Vitamin D Deficiency    Chronic fatigue    Chronic bilateral low back pain without sciatica    Slow transit constipation    Iron deficiency anemia due to chronic blood loss       Past Surgical History:   Procedure Laterality Date     SECTION  2002    MAMMOPLASTY AUGMENTATION      removed 2020    [facility]  DELIVERY ONLY      Description:  Section;  Recorded: 2011;       Family History   Problem Relation Age of Onset    Hypertension Mother     Cancer Father         LUNG       Reviewed past medical, surgical, and family history with patient found on new patient intake packet located in EMR Media tab.     SOCIAL HX: nonsmoker, no heavy drinking, no alcohol use, no rec drug use    ROS: positive for reflux, muscle pain, muscle fatigue, insomnia, excessive tiredness. Specifically negative for bowel/bladder dysfunction, balance changes, headache, dizziness, foot drop, fevers, chills, appetite changes, nausea/vomiting, unexplained weight loss. Otherwise 13 systems reviewed are negative. Please see the patient's intake questionnaire from today for details.    OBJECTIVE:  /83   Pulse 74   SpO2 100%     PHYSICAL EXAMINATION:    --CONSTITUTIONAL:  Vital signs as above.  No acute distress.  The patient is well nourished and well groomed.  --PSYCHIATRIC:  Appropriate mood and affect. The patient is awake, alert, oriented to person, place, time and answering questions appropriately with clear speech.    --SKIN:  Skin over the face, bilateral lower extremities, and posterior torso is clean, dry, intact without rashes.    --RESPIRATORY: Normal rhythm and effort. No abnormal accessory muscle breathing patterns noted.   --ABDOMINAL:  Non-distended.    --GROSS MOTOR: Gait is  non-antalgic. Easily arises from a seated position. Toe walking and heel walking are normal without significant difficulty.      --LOWER EXTREMITY MOTOR TESTING:  Hip flexion: right 5/5, left 5/5  Hip abduction: right 5/5, left 5/5  Hip adduction: right 5/5, left 5/5   Quads: right 5/5, left 5/5  Hamstrings: right 5/5, left 5/5  Dorsiflexion: right 5/5, left 5/5  Plantar flexion: right 5/5, left 5/5    Great toe MTP extension/EHL: right 5/5, left 5/5    --NEUROLOGICAL:  1/4 patellar and achilles reflexes bilaterally. Sensation to light touch is intact throughout both lower extremities. Babinski is negative. No clonus.    --STANDING EXAMINATION:  Normal lumbar lordosis noted, no lateral shift.    --MUSCULOSKELETAL: Lumbar spine inspection reveals no evidence of deformity. Range of motion is not limited in lumbar flexion, extension, lateral rotation, in fact these exercises provide relief. Upper lumbar and lower lumbar point tenderness to palpation. Diffuse mame paraspinal musculature tenderness of lumbar spine.     Straight leg raising is negative.    --SACROILIAC JOINT: One finger point test was negative. Negative SI joint tenderness to palpation bilaterally.    --VASCULAR:  Bilateral lower extremities are warm without any discoloration.  There is no pitting edema of the bilateral lower extremities.    RESULTS:   Prior medical records from [facility] and Care Everywhere were reviewed today.    Imaging: Spine imaging was personally reviewed and interpreted today. The images were shown to the patient and the findings were explained using a spine model.    EXAM: MR LUMBAR SPINE W/O CONTRAST  LOCATION: [facility]  DATE: 10/1/2023     INDICATION: Low back pain, no complicating feature; chronic low back pain duration >= three months; worsening or not improving. None of the following: PT chiropractic in the last sixty days or follow-up in the last twenty-eight to sixty days.     COMPARISON:  None.     TECHNIQUE: Routine lumbar spine MRI without IV contrast.     FINDINGS: Nomenclature is based on five lumbar-type vertebral bodies. Normal vertebral body heights, alignment and marrow signal. Normal distal spinal cord and cauda equina with conus medullaris at L1. No retroperitoneal solid mass or adenopathy.   Symmetric psoas appearance. Normal caliber abdominal aorta. Satisfactory renal contours where visualized. No marrow or ligamentous edema. Satisfactory sacral alignment. No presacral mass or fluid collections are noted. No free pelvic fluid. Nothing for   sacral insufficiency/stress fracture. Nothing for a marrow infiltrative process.     T12-L1: Normal disc height and signal. No herniation. Normal facets. No spinal canal or neural foraminal stenosis.      L1-L2: Normal disc height and signal. No herniation. Normal facets. No spinal canal or neural foraminal stenosis.     L2-L3: Normal disc height and signal. No herniation. Normal facets. No spinal canal or neural foraminal stenosis.      L3-L4: Normal disc height and signal. No herniation. Normal facets. No spinal canal or neural foraminal stenosis.     L4-L5: Normal disc height and signal. No herniation. Normal facets. No spinal canal or neural foraminal stenosis.     L5-S1: Satisfactory disc height. Annular bulge and mild disc desiccation. No disc herniation. Prominence of epidural fat may represent epidural lipomatosis at the L5-S1 and upper S1 level. This effaces the subarachnoid space about the nerve roots of the   cauda equina at this level series 7, image 12 and results in effective severe canal stenosis. Mild foraminal compromise bilaterally. Very tiny focal annular tear/disruption left paracentral zone series 3, image 11 does abut the traversing left S1 nerve   root, and this proximity to the annular tear could result in intermittent or positional left S1 radicular presentation of symptoms. No contact or displacement of the traversing left S1  nerve root is noted. Facet joints are normal.                                                                      IMPRESSION:  1.  Annular bulge at L5-S1 with focal left paracentral annular tear/disruption at L5-S1 could result in left S1 radiculopathy. No contact or displacement of the traversing left S1 nerve root is noted.     2.  Prominence of epidural fat at L5-S1 and upper S1 level does efface the CSF about the nerve roots of the cauda equina resulting in effective severe spinal stenosis. This represents epidural lipomatosis at and below the L5-S1 interspace.     3.  No additional spinal canal compromise, foraminal narrowing or disc herniations noted at the remainder of lumbar levels.        This note was dictated using voice recognition software. Any grammatical or context distortions are unintentional and inherent to the software.       Sima Pennington FNP-C  [facility]  O. [phone]

## 2024-04-19 NOTE — LETTER
4/19/2024         RE: Brittany Pickard  [address]  [facility] [address]        Dear Colleague,    Thank you for referring your patient, Brittany Pickard, to the [facility]. Please see a copy of my visit note below.    ASSESSMENT: Brittany Pickard is a 43 year old female who presents for consultation at the request of PCP Karly Lee, who presents today for new patient evaluation of:    -low back pain     Patient is neurologically intact on exam. No myelopathic or red flag symptoms.   She has some point tenderness of the lower and upper lumbar regions. Axel paraspinal musculature tenderness. We reviewed her lumbar MRI which shows a disc bulge at L5-S1 with epidural lipomatosis which effaces the subarachnoid space and results in severe canal stenosis with a tiny focal annular tear/disruption left paracentral zone which abuts the traversing left S1 nerve. She is not having any leg pain, numbness, or weakness in the legs.  I suspect she is more symptomatic from facet arthropathy/muscle spasm rather than her severe canal stenosis. Her symptoms improve with exercise, stretching. We talked about options and I recommended muscle relaxer, anti-inflammatory, and starting a course of PT. We talked about the role of weight loss in epidural lipomatosis. She will follow up after PT to discuss        4/19/2024     8:33 AM   OSWESTRY DISABILITY INDEX   Count 9   Sum 10   Oswestry Score (%) 22.22 %            Diagnoses and all orders for this visit:  Chronic bilateral low back pain without sciatica  -     Spine  Referral  -     methocarbamol (ROBAXIN) 750 MG tablet; Take 1 tablet (750 mg) by mouth 4 times daily as needed  -     Physical Therapy  Referral; Future  -     Massage Therapy Referral; Future  -     naproxen (EC-NAPROXEN) 500 MG TBEC; Take 1 tablet (500 mg) by mouth 2 times daily as needed for moderate pain      PLAN:  Reviewed spine anatomy and disease process.  Discussed diagnosis and treatment options with the patient today. A shared decision making model was used.  The patient's values and choices were respected. The following represents what was discussed and decided upon by the provider and the patient.      -DIAGNOSTIC TESTS:  Images were personally reviewed and interpreted and explained to patient today using a spine model.   -did not order any new imaging    -PHYSICAL THERAPY:    --Referral to PT placed  Discussed the importance of core strengthening, ROM, stretching exercises with the patient and how each of these entities is important in decreasing pain.  Explained to the patient that the purpose of physical therapy is to teach the patient a home exercise program.  These exercises need to be performed every day in order to decrease pain and prevent future occurrences of pain.        -MEDICATIONS:    --start naproxen 500mg twice daily (ec version dt insurance)  -start robaxin 500mg QID PRN   -continue tylenol  Discussed multiple medication options today with patient. Discussed risks, side effects, and proper use of medications. Patient verbalized understanding.    -INTERVENTIONS:    -Did not discuss injections. Patient would be a good candidate in the future for either epidural steroid injections or medial branch blocks if indicated based on symptoms and supported by imaging results.    -PATIENT EDUCATION:  Total time of 40 minutes, on the day of service, spent with the patient, reviewing the chart, placing orders, and documenting.   -Today we also discussed the pros and cons of the current treatment plan.    -FOLLOW-UP:   after PT    Advised patient to call the [facility] if symptoms worsen, new numbness or weakness develops in the legs, or if they develop new or worsening problems controlling bladder or bowel function.   ______________________________________________________________________    SUBJECTIVE:    HPI:  Brittany Modimahamed  Is a 43 year old female   with hx acid reflux, vitamin D deficiency, iron deficiency anemia, chronic fatigue, insomnia who presents today for new patient evaluation of back pain     Pain started 3 yrs ago without trauma. She travels a lot for work with long car rides, which she feels started things. She complains of midline and bilateral lower thoracic and lumbar pain. Worse with stress, sitting, sleeping in certain positions, and in the morning. Each day is different. Some days are good and others are not good. On average, pain level is between 7-8/10, 4x a month gets to 10/10, at best a 5/10. She describes it as dull,achy. Improves if she lies on the floor, does some gentle exercises, uses heat, hot showers, and lidocaine gel. When she exercises, she feels better.    No fever, chills, arm or leg pain, abdominal or chest pain. She denies numbness or tingling, or weakness, unanticipated weight loss, or changes in bowel or bladder function. She does feel some generalized fatigue.    She has not had any medication changes in the last 3 years  She takes tylenol as needed for pain which works for several hours    No history of PT,  injections, or spine surgeries    -Treatment to Date:     -Medications:  tylenol    Current Outpatient Medications   Medication Sig Dispense Refill     methocarbamol (ROBAXIN) 750 MG tablet Take 1 tablet (750 mg) by mouth 4 times daily as needed 40 tablet 0     naproxen (EC-NAPROXEN) 500 MG TBEC Take 1 tablet (500 mg) by mouth 2 times daily as needed for moderate pain 60 tablet 0     cholecalciferol, vitamin D3, (VITAMIN D3 ORAL) [CHOLECALCIFEROL, VITAMIN D3, (VITAMIN D3 ORAL)] Take 4,000 Int'l Units by mouth daily.        doxepin (SILENOR) 3 MG tablet Take 1 tablet (3 mg) by mouth nightly as needed for sleep 30 tablet 0     ferrous sulfate (FEROSUL) 325 (65 Fe) MG tablet Take 1 tablet (325 mg) by mouth daily (with breakfast) 90 tablet 1     multivitamin w/minerals (THERA-VIT-M) tablet Take 1 tablet by  mouth daily 90 tablet 3     vitamin B-12 (CYANOCOBALAMIN) 1000 MCG tablet Take 1 tablet (1,000 mcg) by mouth daily 90 tablet 1     No current facility-administered medications for this visit.       Allergies   Allergen Reactions     Melatonin Hives     Other Allergy (See Comments) [External Allergen Needs Reconciliation - See Comment] Unknown     Melatonin TABS, 2013.         No past medical history on file.     Patient Active Problem List   Diagnosis     Allergic Rhinitis     Insomnia     Vitamin D Deficiency     Chronic fatigue     Chronic bilateral low back pain without sciatica     Slow transit constipation     Iron deficiency anemia due to chronic blood loss       Past Surgical History:   Procedure Laterality Date      SECTION  2002     MAMMOPLASTY AUGMENTATION      removed 2020     [facility]  DELIVERY ONLY      Description:  Section;  Recorded: 2011;       Family History   Problem Relation Age of Onset     Hypertension Mother      Cancer Father         LUNG       Reviewed past medical, surgical, and family history with patient found on new patient intake packet located in EMR Media tab.     SOCIAL HX: nonsmoker, no heavy drinking, no alcohol use, no rec drug use    ROS: positive for reflux, muscle pain, muscle fatigue, insomnia, excessive tiredness. Specifically negative for bowel/bladder dysfunction, balance changes, headache, dizziness, foot drop, fevers, chills, appetite changes, nausea/vomiting, unexplained weight loss. Otherwise 13 systems reviewed are negative. Please see the patient's intake questionnaire from today for details.    OBJECTIVE:  /83   Pulse 74   SpO2 100%     PHYSICAL EXAMINATION:    --CONSTITUTIONAL:  Vital signs as above.  No acute distress.  The patient is well nourished and well groomed.  --PSYCHIATRIC:  Appropriate mood and affect. The patient is awake, alert, oriented to person, place, time and answering questions appropriately with clear  speech.    --SKIN:  Skin over the face, bilateral lower extremities, and posterior torso is clean, dry, intact without rashes.    --RESPIRATORY: Normal rhythm and effort. No abnormal accessory muscle breathing patterns noted.   --ABDOMINAL:  Non-distended.    --GROSS MOTOR: Gait is non-antalgic. Easily arises from a seated position. Toe walking and heel walking are normal without significant difficulty.      --LOWER EXTREMITY MOTOR TESTING:  Hip flexion: right 5/5, left 5/5  Hip abduction: right 5/5, left 5/5  Hip adduction: right 5/5, left 5/5   Quads: right 5/5, left 5/5  Hamstrings: right 5/5, left 5/5  Dorsiflexion: right 5/5, left 5/5  Plantar flexion: right 5/5, left 5/5    Great toe MTP extension/EHL: right 5/5, left 5/5    --NEUROLOGICAL:  1/4 patellar and achilles reflexes bilaterally. Sensation to light touch is intact throughout both lower extremities. Babinski is negative. No clonus.    --STANDING EXAMINATION:  Normal lumbar lordosis noted, no lateral shift.    --MUSCULOSKELETAL: Lumbar spine inspection reveals no evidence of deformity. Range of motion is not limited in lumbar flexion, extension, lateral rotation, in fact these exercises provide relief. Upper lumbar and lower lumbar point tenderness to palpation. Diffuse mame paraspinal musculature tenderness of lumbar spine.     Straight leg raising is negative.    --SACROILIAC JOINT: One finger point test was negative. Negative SI joint tenderness to palpation bilaterally.    --VASCULAR:  Bilateral lower extremities are warm without any discoloration.  There is no pitting edema of the bilateral lower extremities.    RESULTS:   Prior medical records from [facility] and Care Everywhere were reviewed today.    Imaging: Spine imaging was personally reviewed and interpreted today. The images were shown to the patient and the findings were explained using a spine model.    EXAM: MR LUMBAR SPINE W/O CONTRAST  LOCATION: [facility]  HOSPITAL  DATE: 10/1/2023     INDICATION: Low back pain, no complicating feature; chronic low back pain duration >= three months; worsening or not improving. None of the following: PT chiropractic in the last sixty days or follow-up in the last twenty-eight to sixty days.     COMPARISON: None.     TECHNIQUE: Routine lumbar spine MRI without IV contrast.     FINDINGS: Nomenclature is based on five lumbar-type vertebral bodies. Normal vertebral body heights, alignment and marrow signal. Normal distal spinal cord and cauda equina with conus medullaris at L1. No retroperitoneal solid mass or adenopathy.   Symmetric psoas appearance. Normal caliber abdominal aorta. Satisfactory renal contours where visualized. No marrow or ligamentous edema. Satisfactory sacral alignment. No presacral mass or fluid collections are noted. No free pelvic fluid. Nothing for   sacral insufficiency/stress fracture. Nothing for a marrow infiltrative process.     T12-L1: Normal disc height and signal. No herniation. Normal facets. No spinal canal or neural foraminal stenosis.      L1-L2: Normal disc height and signal. No herniation. Normal facets. No spinal canal or neural foraminal stenosis.     L2-L3: Normal disc height and signal. No herniation. Normal facets. No spinal canal or neural foraminal stenosis.      L3-L4: Normal disc height and signal. No herniation. Normal facets. No spinal canal or neural foraminal stenosis.     L4-L5: Normal disc height and signal. No herniation. Normal facets. No spinal canal or neural foraminal stenosis.     L5-S1: Satisfactory disc height. Annular bulge and mild disc desiccation. No disc herniation. Prominence of epidural fat may represent epidural lipomatosis at the L5-S1 and upper S1 level. This effaces the subarachnoid space about the nerve roots of the   cauda equina at this level series 7, image 12 and results in effective severe canal stenosis. Mild foraminal compromise bilaterally. Very tiny focal  annular tear/disruption left paracentral zone series 3, image 11 does abut the traversing left S1 nerve   root, and this proximity to the annular tear could result in intermittent or positional left S1 radicular presentation of symptoms. No contact or displacement of the traversing left S1 nerve root is noted. Facet joints are normal.                                                                      IMPRESSION:  1.  Annular bulge at L5-S1 with focal left paracentral annular tear/disruption at L5-S1 could result in left S1 radiculopathy. No contact or displacement of the traversing left S1 nerve root is noted.     2.  Prominence of epidural fat at L5-S1 and upper S1 level does efface the CSF about the nerve roots of the cauda equina resulting in effective severe spinal stenosis. This represents epidural lipomatosis at and below the L5-S1 interspace.     3.  No additional spinal canal compromise, foraminal narrowing or disc herniations noted at the remainder of lumbar levels.        This note was dictated using voice recognition software. Any grammatical or context distortions are unintentional and inherent to the software.       Sima Pennington FNP-C  [facility]  O. [phone]             Again, thank you for allowing me to participate in the care of your patient.        Sincerely,        TIGRE White CNP

## 2024-06-17 NOTE — PROGRESS NOTES
[facility]: Cancer Care                                                                                          Called Brittany and SHERRY about missed apt (lab) today. Scheduling line was provided to reschedule apt.  (Of note, patient has follow up scheduled on 6.19 and lab apt scheduled today in preparation for her apt.. Scheduling team also made aware to follow up with patient if she waldemar not call back,    Signature:  Tennille Sena RN

## 2024-08-23 ENCOUNTER — PATIENT OUTREACH (OUTPATIENT)
Dept: CARE COORDINATION | Facility: CLINIC | Age: 44
End: 2024-08-23
Payer: COMMERCIAL

## 2024-09-20 ENCOUNTER — PATIENT OUTREACH (OUTPATIENT)
Dept: CARE COORDINATION | Facility: CLINIC | Age: 44
End: 2024-09-20
Payer: COMMERCIAL

## 2024-09-22 ENCOUNTER — HEALTH MAINTENANCE LETTER (OUTPATIENT)
Age: 44
End: 2024-09-22

## 2024-12-01 ENCOUNTER — HEALTH MAINTENANCE LETTER (OUTPATIENT)
Age: 44
End: 2024-12-01

## 2025-01-07 ENCOUNTER — HOSPITAL ENCOUNTER (OUTPATIENT)
Dept: MAMMOGRAPHY | Facility: CLINIC | Age: 45
Discharge: HOME | End: 2025-01-07
Attending: PHYSICIAN ASSISTANT
Payer: COMMERCIAL

## 2025-01-07 DIAGNOSIS — Z12.31 VISIT FOR SCREENING MAMMOGRAM: ICD-10-CM

## 2025-01-07 PROCEDURE — 77063 BREAST TOMOSYNTHESIS BI: CPT

## 2025-02-04 ENCOUNTER — E-VISIT (OUTPATIENT)
Dept: URGENT CARE | Facility: CLINIC | Age: 45
End: 2025-02-04

## 2025-02-04 DIAGNOSIS — J01.90 ACUTE BACTERIAL SINUSITIS: Primary | ICD-10-CM

## 2025-02-04 DIAGNOSIS — B96.89 ACUTE BACTERIAL SINUSITIS: Primary | ICD-10-CM

## 2025-02-04 NOTE — PATIENT INSTRUCTIONS
Acute Sinusitis: Care Instructions  Overview     Acute sinusitis is an inflammation of the mucous membranes inside the nose and sinuses. Sinuses are the hollow spaces in your skull around the eyes and nose. Acute sinusitis often follows a cold. Acute sinusitis causes thick, discolored mucus that drains from the nose or down the back of the throat. It also can cause pain and pressure in your head and face along with a stuffy or blocked nose.  In most cases, sinusitis gets better on its own in 1 to 2 weeks. But some mild symptoms may last for several weeks. Sometimes antibiotics are needed if there is a bacterial infection.  Follow-up care is a key part of your treatment and safety. Be sure to make and go to all appointments, and call your doctor if you are having problems. It's also a good idea to know your test results and keep a list of the medicines you take.  How can you care for yourself at home?  Use saline (saltwater) nasal washes. This can help keep your nasal passages open and wash out mucus and allergens.  You can buy saline nose washes at a grocery store or drugstore. Follow the instructions on the package.  You can make your own at home. Add 1 teaspoon of non-iodized salt and 1 teaspoon of baking soda to 2 cups of distilled or boiled and cooled water. Fill a squeeze bottle or a nasal cleansing pot (such as a neti pot) with the nasal wash. Then put the tip into your nostril, and lean over the sink. With your mouth open, gently squirt the liquid. Repeat on the other side.  Try a decongestant nasal spray like oxymetazoline (Afrin). Do not use it for more than 3 days in a row. Using it for more than 3 days can make your congestion worse.  If needed, take an over-the-counter pain medicine, such as acetaminophen (Tylenol), ibuprofen (Advil, Motrin), or naproxen (Aleve). Read and follow all instructions on the label.  If the doctor prescribed antibiotics, take them as directed. Do not stop taking them just  "because you feel better. You need to take the full course of antibiotics.  Be careful when taking over-the-counter cold or flu medicines and Tylenol at the same time. Many of these medicines have acetaminophen, which is Tylenol. Read the labels to make sure that you are not taking more than the recommended dose. Too much acetaminophen (Tylenol) can be harmful.  Try a steroid nasal spray. It may help with your symptoms.  Breathe warm, moist air. You can use a steamy shower, a hot bath, or a sink filled with hot water. Avoid cold, dry air. Using a humidifier in your home may help. Follow the directions for cleaning the machine.  When should you call for help?   Call your doctor now or seek immediate medical care if:    You have new or worse swelling, redness, or pain in your face or around one or both of your eyes.     You have double vision or a change in your vision.     You have a high fever.     You have a severe headache and a stiff neck.     You have mental changes, such as feeling confused or much less alert.   Watch closely for changes in your health, and be sure to contact your doctor if:    You are not getting better as expected.   Where can you learn more?  Go to https://www.[employer].net/patiented  Enter I933 in the search box to learn more about \"Acute Sinusitis: Care Instructions.\"  Current as of: September 27, 2023  Content Version: 14.3    2024 [employer].   Care instructions adapted under license by your healthcare professional. If you have questions about a medical condition or this instruction, always ask your healthcare professional. [employer] disclaims any warranty or liability for your use of this information.    Dear Brittany Pickard.      Based on your responses and diagnosis, I have prescribed Augmentin  to treat your symptoms. I have sent this to your pharmacy.?     It is also important to stay well hydrated, get lots of rest and take over-the-counter " decongestants,?tylenol?or ibuprofen if you?are able to?take those medications per your primary care provider to help relieve discomfort.?     It is important that you take?all of?your prescribed medication even if your symptoms are improving after a few doses.? Taking?all of?your medicine helps prevent the symptoms from returning.?     If your symptoms worsen, you develop severe headache, vomiting, high fever (>102), or are not improving in 7 days, please contact your primary care provider for an appointment or visit any of our convenient Walk-in Care or Urgent Care Centers to be seen which can be found on our website?here.?     Thanks again for choosing?us?as your health care partner,?   ?  Ez George MD?   Thank you for choosing us for your care. I have placed an order for a prescription so that you can start treatment. View your full visit summary for details by clicking on the link below. Your pharmacist will able to address any questions you may have about the medication.     If you're not feeling better within 5-7 days, please schedule an appointment.  You can schedule an appointment right here in [facility], or call [phone]  If the visit is for the same symptoms as your eVisit, we'll refund the cost of your eVisit if seen within seven days.